# Patient Record
Sex: FEMALE | Race: WHITE | Employment: OTHER | URBAN - METROPOLITAN AREA
[De-identification: names, ages, dates, MRNs, and addresses within clinical notes are randomized per-mention and may not be internally consistent; named-entity substitution may affect disease eponyms.]

---

## 2017-02-14 ENCOUNTER — OFFICE VISIT (OUTPATIENT)
Dept: OBGYN CLINIC | Age: 52
End: 2017-02-14

## 2017-02-14 DIAGNOSIS — Z12.31 ENCOUNTER FOR SCREENING MAMMOGRAM FOR MALIGNANT NEOPLASM OF BREAST: Primary | ICD-10-CM

## 2017-02-17 ENCOUNTER — TELEPHONE (OUTPATIENT)
Dept: OBGYN CLINIC | Age: 52
End: 2017-02-17

## 2017-02-17 NOTE — TELEPHONE ENCOUNTER
Notified pt with MAMM tech recommendation that the prior MAMM result came today and will be uploaded and that she should receive the results Monday. Pt verbalized understanding.

## 2017-02-20 ENCOUNTER — TELEPHONE (OUTPATIENT)
Dept: OBGYN CLINIC | Age: 52
End: 2017-02-20

## 2017-02-20 NOTE — TELEPHONE ENCOUNTER
45year old patient last seen in the office on  2/14/17. Patient calling to check on her mammogram results. This nurse advised of MD reviewed mammogram results and advised patient that mammograms from  2011 and 2012 have been reviewed. Patient verbalized understanding.

## 2017-05-10 ENCOUNTER — HOSPITAL ENCOUNTER (OUTPATIENT)
Dept: INFUSION THERAPY | Age: 52
Discharge: HOME | End: 2017-05-10
Payer: COMMERCIAL

## 2017-05-10 ENCOUNTER — OFFICE VISIT (OUTPATIENT)
Dept: ONCOLOGY | Age: 52
End: 2017-05-10

## 2017-05-10 VITALS
RESPIRATION RATE: 16 BRPM | DIASTOLIC BLOOD PRESSURE: 81 MMHG | SYSTOLIC BLOOD PRESSURE: 119 MMHG | HEART RATE: 80 BPM | TEMPERATURE: 98.1 F

## 2017-05-10 VITALS
RESPIRATION RATE: 18 BRPM | BODY MASS INDEX: 20.25 KG/M2 | SYSTOLIC BLOOD PRESSURE: 135 MMHG | HEART RATE: 76 BPM | WEIGHT: 133.6 LBS | OXYGEN SATURATION: 98 % | TEMPERATURE: 97 F | HEIGHT: 68 IN | DIASTOLIC BLOOD PRESSURE: 103 MMHG

## 2017-05-10 DIAGNOSIS — D64.9 ANEMIA, UNSPECIFIED TYPE: Primary | ICD-10-CM

## 2017-05-10 DIAGNOSIS — Z15.89 MTHFR MUTATION: ICD-10-CM

## 2017-05-10 DIAGNOSIS — D72.819 LEUKOPENIA, UNSPECIFIED TYPE: ICD-10-CM

## 2017-05-10 LAB
BASOPHILS # BLD AUTO: 0 K/UL (ref 0–0.1)
BASOPHILS # BLD: 1 % (ref 0–1)
EOSINOPHIL # BLD: 0 K/UL (ref 0–0.4)
EOSINOPHIL NFR BLD: 1 % (ref 0–7)
ERYTHROCYTE [DISTWIDTH] IN BLOOD BY AUTOMATED COUNT: 13 % (ref 11.5–14.5)
FOLATE SERPL-MCNC: 16.2 NG/ML (ref 5–21)
HAPTOGLOB SERPL-MCNC: 158 MG/DL (ref 30–200)
HCT VFR BLD AUTO: 36.6 % (ref 35–47)
HCYS SERPL-SCNC: 12 UMOL/L (ref 3.7–13.9)
HGB BLD-MCNC: 12.2 G/DL (ref 11.5–16)
LDH SERPL L TO P-CCNC: 160 U/L (ref 81–246)
LYMPHOCYTES # BLD AUTO: 34 % (ref 12–49)
LYMPHOCYTES # BLD: 1.5 K/UL (ref 0.8–3.5)
MCH RBC QN AUTO: 31.2 PG (ref 26–34)
MCHC RBC AUTO-ENTMCNC: 33.3 G/DL (ref 30–36.5)
MCV RBC AUTO: 93.6 FL (ref 80–99)
MONOCYTES # BLD: 0.4 K/UL (ref 0–1)
MONOCYTES NFR BLD AUTO: 9 % (ref 5–13)
NEUTS SEG # BLD: 2.5 K/UL (ref 1.8–8)
NEUTS SEG NFR BLD AUTO: 55 % (ref 32–75)
PLATELET # BLD AUTO: 336 K/UL (ref 150–400)
RBC # BLD AUTO: 3.91 M/UL (ref 3.8–5.2)
RETICS/RBC NFR AUTO: 0.7 % (ref 0.7–2.1)
VIT B12 SERPL-MCNC: 414 PG/ML (ref 211–911)
WBC # BLD AUTO: 4.4 K/UL (ref 3.6–11)

## 2017-05-10 PROCEDURE — 83010 ASSAY OF HAPTOGLOBIN QUANT: CPT | Performed by: INTERNAL MEDICINE

## 2017-05-10 PROCEDURE — 36415 COLL VENOUS BLD VENIPUNCTURE: CPT | Performed by: INTERNAL MEDICINE

## 2017-05-10 PROCEDURE — 82607 VITAMIN B-12: CPT | Performed by: INTERNAL MEDICINE

## 2017-05-10 PROCEDURE — 83921 ORGANIC ACID SINGLE QUANT: CPT | Performed by: INTERNAL MEDICINE

## 2017-05-10 PROCEDURE — 82746 ASSAY OF FOLIC ACID SERUM: CPT | Performed by: INTERNAL MEDICINE

## 2017-05-10 PROCEDURE — 85045 AUTOMATED RETICULOCYTE COUNT: CPT | Performed by: INTERNAL MEDICINE

## 2017-05-10 PROCEDURE — 83090 ASSAY OF HOMOCYSTEINE: CPT | Performed by: INTERNAL MEDICINE

## 2017-05-10 PROCEDURE — 83615 LACTATE (LD) (LDH) ENZYME: CPT | Performed by: INTERNAL MEDICINE

## 2017-05-10 PROCEDURE — 85025 COMPLETE CBC W/AUTO DIFF WBC: CPT | Performed by: INTERNAL MEDICINE

## 2017-05-10 NOTE — PROGRESS NOTES
Blood pressure 119/81, pulse 80, temperature 98.1 °F (36.7 °C), resp. rate 16. Pt arrived at 1445,labs drawn peripherally from left [facility], [place]. Pt tolerated well and left at 1454.

## 2017-05-10 NOTE — PROGRESS NOTES
[address], [address] [place] [address]  [place], [place]   W: [phone]  F: [phone]     NEW HEME/ONC CONSULT      Reason for visit:  evaluation for treatment for leukopenia and anemia    HPI:   Vidal Cortez is a 45 y.o.  female who I was asked to see in consultation at the request of Dr. Harper Mills for evaluation for chronic anemia and leukopenia. Has a history of MTHFR mutation (homozygous) and issues with Y20 and folic acid. 11/8/16 ferritin 35; iron 89; % sat 38; TIBC 299  10/28/16 wbc 2.8; hgb 10.8; plt 220  11/8/16 wbc 3.8, hgb 11.7; plt 277    She feels extremely fatigued, occurring for 1.5 years, losing about 16 lbs in that time. No fevers, no appetite, no night sweats. Stated her skin color would change and had other skin changes, but those have resolved. Has had an DELONTE level to 1:2560. Has not ever had B12 injections, but has taken B12 PO vitamins. Does have occasional burning in her feet. Of note, her mother passed away yesterday. DX   Encounter Diagnoses   Name Primary?  Anemia, unspecified type Yes    Leukopenia, unspecified type     [facility]               Past Medical History:   Diagnosis Date    EBV positive mononucleosis syndrome 7/22/2009   Sarah Higgins infection     Hypoglycemia     Hypotension     Neurological disorder     annurysm     Parasite infection     Shingles 2006    Vasovagal syncope 10/2/2016    Zoster 7/22/2009     History reviewed. No pertinent surgical history.   Social History     Social History    Marital status:      Spouse name: N/A    Number of children: N/A    Years of education: N/A     Social History Main Topics    Smoking status: Never Smoker    Smokeless tobacco: None    Alcohol use Yes    Drug use: No    Sexual activity: Yes     Partners: Male     Other Topics Concern    None     Social History Narrative     Family History   Problem Relation Age of Onset    Anemia Mother      pernicious anemia    Heart Disease Mother     Hypertension Mother     Cancer Brother 52     thyroid    Diabetes Brother        Current Outpatient Prescriptions   Medication Sig Dispense Refill    cholecalciferol (VITAMIN D3) 1,000 unit cap Take 2,500 Units by mouth every seven (7) days.  LACTOBACILLUS COMBO NO.6 (PROBIOTIC COMPLEX PO) Take 1 Tab by mouth daily. Allergies   Allergen Reactions    Amoxicillin Nausea and Vomiting    Benadr [Diphenhydramine Hcl] Rash    Sulfa (Sulfonamide Antibiotics) Hives       Review of Systems    A comprehensive review of systems was performed and all systems were negative except for HPI. Objective:  Physical Exam:  Visit Vitals    BP (!) 135/103    Pulse 76    Temp 97 °F (36.1 °C) (Temporal)    Resp 18    Ht 5' 8\" (1.727 m)    Wt 133 lb 9.6 oz (60.6 kg)    SpO2 98%    BMI 20.31 kg/m2       General:  Alert, cooperative, no distress, appears stated age. Head:  Normocephalic, without obvious abnormality, atraumatic. Eyes:  Conjunctivae/corneas clear. PERRL, EOMs intact. Throat: Lips, mucosa, and tongue normal.    Neck: Supple, symmetrical, trachea midline, no adenopathy, thyroid: no enlargement/tenderness/nodules   Back:   Symmetric, no curvature. ROM normal. No CVA tenderness. Lungs:   Clear to auscultation bilaterally. Chest wall:  No tenderness or deformity. Heart:  Regular rate and rhythm, S1, S2 normal, no murmur, click, rub or gallop. Abdomen:   Soft, non-tender. Bowel sounds normal. No masses,  No organomegaly. Extremities: Extremities normal, atraumatic, no cyanosis or edema. Skin: Skin color, texture, turgor normal. No rashes or lesions. Neurologic: CNII-XII intact. Diagnostic Imaging   Results for orders placed during the hospital encounter of 09/04/16   XR CHEST PA LAT    Narrative Chest PA and lateral    History: Short of breath. Chest pain. Comparison: None    Findings:   The lungs are well expanded. No focal consolidation, pleural  effusion, or pneumothorax. The cardiomediastinal silhouette is unremarkable. The visualized osseous structures are unremarkable. Impression Impression:  No acute cardiopulmonary process. Lab Results  Lab Results   Component Value Date/Time    WBC 5.4 09/04/2016 10:42 PM    HGB 11.7 09/04/2016 10:42 PM    HCT 35.4 09/04/2016 10:42 PM    PLATELET 536 02/34/6972 10:42 PM    MCV 96.2 09/04/2016 10:42 PM       Lab Results   Component Value Date/Time    Sodium 137 09/04/2016 10:42 PM    Potassium 3.7 09/04/2016 10:42 PM    Chloride 104 09/04/2016 10:42 PM    CO2 26 09/04/2016 10:42 PM    Anion gap 7 09/04/2016 10:42 PM    Glucose 114 09/04/2016 10:42 PM    BUN 9 09/04/2016 10:42 PM    Creatinine 0.97 09/04/2016 10:42 PM    BUN/Creatinine ratio 9 09/04/2016 10:42 PM    GFR est AA >60 09/04/2016 10:42 PM    GFR est non-AA >60 09/04/2016 10:42 PM    Calcium 8.8 09/04/2016 10:42 PM    AST (SGOT) 20 09/04/2016 10:42 PM    Alk. phosphatase 60 09/04/2016 10:42 PM    Protein, total 7.3 09/04/2016 10:42 PM    Albumin 3.6 09/04/2016 10:42 PM    Globulin 3.7 09/04/2016 10:42 PM    A-G Ratio 1.0 09/04/2016 10:42 PM    ALT (SGPT) 23 09/04/2016 10:42 PM           Assessment/Plan:  45 y.o. female with leukopenia and anemia. Records reviewed and summarized above. PS 0    1. Anemia:  May be due to B12 or folate deficiency; will recheck those today to obtain current values and will check retic count as well; may need B12 repletion -- I would be happy to do or have Dr. Dana Arreaga perform. If B12 is deficient, would recommend 1 mg B12 IM for 5 days, then weekly x 4 weeks, then monthly with folic acid 1 mg daily PO    Will also check LD and haptoglobin, MMA, homocysteine    Will also check bone marrow biopsy to check for any myelofibrosis, discussed with patient and ordered. 2. Leukopenia:  May be due to cause of #1, will check cbc with diff and smear    3.  MTHFR mutation: likely contributing to #1; has never had a VTE    Thank you for this consult. All of the patient's questions were answered today. Will call her with lab results and see her when her biopsy results return. There are no Patient Instructions on file for this visit. Follow-up Disposition:  Return in about 2 weeks (around 5/24/2017).     Jessica Rice MD

## 2017-05-10 NOTE — MR AVS SNAPSHOT
Visit Information Date & Time Provider Department Dept. Phone Encounter #  
 5/10/2017  1:00 PM [facility] [address] [place] [address] [id number] Follow-up Instructions Return in about 2 weeks (around 5/24/2017). Follow-up and Disposition History Upcoming Health Maintenance Date Due Hepatitis C Screening 1965 DTaP/Tdap/Td series (1 - Tdap) 9/13/1985 FOBT Q 1 YEAR AGE 50-75 9/13/2014 INFLUENZA AGE 9 TO ADULT 8/1/2017 BREAST CANCER SCRN MAMMOGRAM 2/14/2019 PAP AKA CERVICAL CYTOLOGY 5/24/2019 Allergies as of 5/10/2017  Review Complete On: 5/10/2017 By: Jessica Rice MD  
  
 Severity Noted Reaction Type Reactions Amoxicillin  07/22/2009    Nausea and Vomiting Benadr [Diphenhydramine Hcl]  08/07/2015    Rash  
 Sulfa (Sulfonamide Antibiotics)  07/22/2009    Hives Current Immunizations  Never Reviewed No immunizations on file. Not reviewed this visit You Were Diagnosed With   
  
 Codes Comments Anemia, unspecified type    -  Primary ICD-10-CM: D64.9 ICD-9-CM: 285.9 Leukopenia, unspecified type     ICD-10-CM: D72.819 ICD-9-CM: 288.50 MTHFR mutation ([facility] 75.)     ICD-10-CM: E72.12 
ICD-9-CM: 270.4 Vitals BP Pulse Temp Resp Height(growth percentile) Weight(growth percentile) (!) 135/103 76 97 °F (36.1 °C) (Temporal) 18 5' 8\" (1.727 m) 133 lb 9.6 oz (60.6 kg) SpO2 BMI OB Status Smoking Status 98% 20.31 kg/m2 Menopause Never Smoker Vitals History BMI and BSA Data Body Mass Index Body Surface Area  
 20.31 kg/m 2 1.71 m 2 Preferred Pharmacy Pharmacy Name Phone [id number] [address], [address] [place] [address]  [phone] Your Updated Medication List  
  
   
This list is accurate as of: 5/10/17  2:24 PM.  Always use your most recent med list.  
  
  
  
  
 PROBIOTIC COMPLEX PO Take 1 Tab by mouth daily. VITAMIN D3 1,000 unit Cap Generic drug:  cholecalciferol Take 2,500 Units by mouth every seven (7) days. Follow-up Instructions Return in about 2 weeks (around 5/24/2017). To-Do List   
 05/15/2017 Imaging:  CT BX BONE MARROW MERVIN/SUSANA Introducing [facility] SERVICES! Dear Brent Maddox: 
Thank you for requesting a Elevaate account. Our records indicate that you already have an active Elevaate account. You can access your account anytime at [email or web address] [email or web address] [email or web address]. [employer]/[address] [facility] [employer] Did you know that you can access your hospital and ER discharge instructions at any time in Elevaate? You can also review all of your test results from your hospital stay or ER visit. Additional Information If you have questions, please visit the Frequently Asked Questions section of the Elevaate website at [email or web address] Remember, Elevaate is NOT to be used for urgent needs. For medical emergencies, dial 911. Now available from your iPhone and Android! Please provide this summary of care documentation to your next provider. Your primary care clinician is listed as Dmitriy Kruger. If you have any questions after today's visit, please call [phone].

## 2017-05-15 ENCOUNTER — TELEPHONE (OUTPATIENT)
Dept: ONCOLOGY | Age: 52
End: 2017-05-15

## 2017-05-15 NOTE — TELEPHONE ENCOUNTER
Called the patient and verified ID x 2. The patient stated that she spoke with Evelyn Sal NP about her lab results and that they looked good but stated that her Homocysteine level was 12 and previously it was a 9 last December 2016 and a 7 in March 2017 and inquired if she should be concerned since her levels increased by 5 in two months. The patient also stated that she is nervous about the bone marrow biopsy tomorrow and that the slides he looked at were from May or June of 2016 and inquired if he would like to see her blood under a microscope again before the biopsy since those slides were a year ago. Informed the patient that her questions and concerns will be brought to Dr. Mono Thomason and Evelyn Sal NP and that this office will call back with recommendations. The patient verbalized understanding and denied any further questions or concerns.

## 2017-05-15 NOTE — TELEPHONE ENCOUNTER
Called the patient and verified ID x 2. Informed the patient that Dr. Chema Rodrigues is aware of the increase in her Homocysteine levels and that although there was an increase, it is still within normal limits and that he will continue to monitor her levels. Also, informed the patient that Dr. Chema Rodrigues does not recommend her blood be looked at under a microscope but can have labs re-drawn and that the biopsy can be re-scheduled for a later date. The patient stated that she was tested at [facility] for \"staph\" and was informed that she should be checked to see if she has \"staph\" on her skin. Encouraged the patient to have that checked and the patient stated that she may go to [facility] to be checked. Informed the patient that if she does and the test comes back positive to call the scheduling team to re-schedule the bone marrow biopsy as Dr. Chema Rodrigues still recommends this procedure but that it is not urgent. The patient verbalized understanding and denied any further questions or concerns.

## 2017-05-15 NOTE — TELEPHONE ENCOUNTER
Pt called requesting lab results.  She stated someone called her Friday to discuss but she was at her mother's . Call back number [phone]

## 2017-05-16 LAB — METHYLMALONATE SERPL-SCNC: 63 NMOL/L (ref 0–378)

## 2017-06-13 ENCOUNTER — HOSPITAL ENCOUNTER (OUTPATIENT)
Dept: CT IMAGING | Age: 52
Discharge: HOME | End: 2017-06-13
Attending: INTERNAL MEDICINE
Payer: COMMERCIAL

## 2017-06-13 VITALS
HEART RATE: 67 BPM | OXYGEN SATURATION: 100 % | RESPIRATION RATE: 14 BRPM | DIASTOLIC BLOOD PRESSURE: 53 MMHG | SYSTOLIC BLOOD PRESSURE: 102 MMHG

## 2017-06-13 DIAGNOSIS — D64.9 ANEMIA, UNSPECIFIED TYPE: ICD-10-CM

## 2017-06-13 DIAGNOSIS — Z15.89 MTHFR MUTATION: ICD-10-CM

## 2017-06-13 DIAGNOSIS — D72.819 LEUKOPENIA, UNSPECIFIED TYPE: ICD-10-CM

## 2017-06-13 LAB
BASOPHILS # BLD AUTO: 0 K/UL (ref 0–0.1)
BASOPHILS # BLD: 1 % (ref 0–1)
EOSINOPHIL # BLD: 0.3 K/UL (ref 0–0.4)
EOSINOPHIL NFR BLD: 6 % (ref 0–7)
ERYTHROCYTE [DISTWIDTH] IN BLOOD BY AUTOMATED COUNT: 13.1 % (ref 11.5–14.5)
HCT VFR BLD AUTO: 35.8 % (ref 35–47)
HGB BLD-MCNC: 11.9 G/DL (ref 11.5–16)
LYMPHOCYTES # BLD AUTO: 33 % (ref 12–49)
LYMPHOCYTES # BLD: 1.4 K/UL (ref 0.8–3.5)
MCH RBC QN AUTO: 31.6 PG (ref 26–34)
MCHC RBC AUTO-ENTMCNC: 33.2 G/DL (ref 30–36.5)
MCV RBC AUTO: 95 FL (ref 80–99)
MONOCYTES # BLD: 0.4 K/UL (ref 0–1)
MONOCYTES NFR BLD AUTO: 11 % (ref 5–13)
NEUTS SEG # BLD: 2.1 K/UL (ref 1.8–8)
NEUTS SEG NFR BLD AUTO: 49 % (ref 32–75)
PLATELET # BLD AUTO: 293 K/UL (ref 150–400)
RBC # BLD AUTO: 3.77 M/UL (ref 3.8–5.2)
WBC # BLD AUTO: 4.2 K/UL (ref 3.6–11)

## 2017-06-13 PROCEDURE — 85025 COMPLETE CBC W/AUTO DIFF WBC: CPT | Performed by: RADIOLOGY

## 2017-06-13 PROCEDURE — 38221 DX BONE MARROW BIOPSIES: CPT

## 2017-06-13 PROCEDURE — 99152 MOD SED SAME PHYS/QHP 5/>YRS: CPT

## 2017-06-13 PROCEDURE — 74011250636 HC RX REV CODE- 250/636: Performed by: RADIOLOGY

## 2017-06-13 PROCEDURE — 36415 COLL VENOUS BLD VENIPUNCTURE: CPT | Performed by: RADIOLOGY

## 2017-06-13 PROCEDURE — 74011000250 HC RX REV CODE- 250: Performed by: RADIOLOGY

## 2017-06-13 PROCEDURE — 77030028872 HC BN BIOP NDL ON CNTRL TY TELE -C

## 2017-06-13 RX ORDER — FENTANYL CITRATE 50 UG/ML
100 INJECTION, SOLUTION INTRAMUSCULAR; INTRAVENOUS
Status: DISCONTINUED | OUTPATIENT
Start: 2017-06-13 | End: 2017-06-13

## 2017-06-13 RX ORDER — MIDAZOLAM HYDROCHLORIDE 1 MG/ML
5 INJECTION, SOLUTION INTRAMUSCULAR; INTRAVENOUS
Status: DISCONTINUED | OUTPATIENT
Start: 2017-06-13 | End: 2017-06-13

## 2017-06-13 RX ADMIN — FENTANYL CITRATE 25 MCG: 50 INJECTION, SOLUTION INTRAMUSCULAR; INTRAVENOUS at 09:45

## 2017-06-13 RX ADMIN — FENTANYL CITRATE 25 MCG: 50 INJECTION, SOLUTION INTRAMUSCULAR; INTRAVENOUS at 09:54

## 2017-06-13 RX ADMIN — SODIUM BICARBONATE 2 ML: 0.2 INJECTION, SOLUTION INTRAVENOUS at 09:57

## 2017-06-13 RX ADMIN — MIDAZOLAM HYDROCHLORIDE 1 MG: 1 INJECTION, SOLUTION INTRAMUSCULAR; INTRAVENOUS at 09:57

## 2017-06-13 RX ADMIN — FENTANYL CITRATE 25 MCG: 50 INJECTION, SOLUTION INTRAMUSCULAR; INTRAVENOUS at 09:49

## 2017-06-13 RX ADMIN — MIDAZOLAM HYDROCHLORIDE 1 MG: 1 INJECTION, SOLUTION INTRAMUSCULAR; INTRAVENOUS at 09:45

## 2017-06-13 RX ADMIN — MIDAZOLAM HYDROCHLORIDE 1 MG: 1 INJECTION, SOLUTION INTRAMUSCULAR; INTRAVENOUS at 09:54

## 2017-06-13 RX ADMIN — MIDAZOLAM HYDROCHLORIDE 1 MG: 1 INJECTION, SOLUTION INTRAMUSCULAR; INTRAVENOUS at 09:49

## 2017-06-13 NOTE — PROCEDURES
PROCEDURE:Bone marrow biopsy. INDICATION:Anemia. ANESTHESIA:  COMPLICATION:NONE. SPECIMENS REMOVED:none. BLOOD LOSS:NONE. /ASSISTANT:BRIANNA Aguirre RECOMMENDATIONS:none. CONSENT OBTAINED:YES.  NOTES:none.

## 2017-06-13 NOTE — IP AVS SNAPSHOT
Current Discharge Medication List  
  
CONTINUE these medications which have NOT CHANGED Dose & Instructions Dispensing Information Comments Morning Noon Evening Bedtime PROBIOTIC COMPLEX PO Your last dose was: Your next dose is:    
   
   
 Dose:  1 Tab Take 1 Tab by mouth daily. Refills:  0  
     
   
   
   
  
 VITAMIN D3 1,000 unit Cap Generic drug:  cholecalciferol Your last dose was: Your next dose is:    
   
   
 Dose:  2500 Units Take 2,500 Units by mouth every seven (7) days. Refills:  0

## 2017-06-13 NOTE — IP AVS SNAPSHOT
Yuli Lana 
 
 
 [address] [place] [address] 
[phone] Patient: Jewell Reddy MRN: [id number] GNR:6/24/5432 You are allergic to the following Allergen Reactions Amoxicillin Nausea and Vomiting Benadr (Diphenhydramine Hcl) Rash  
    
 Sulfa (Sulfonamide Antibiotics) Hives Recent Documentation Breastfeeding? OB Status Smoking Status No Menopause Never Smoker Emergency Contacts Name Discharge Info Relation Home Work Mobile Lj Teixeira DISCHARGE CAREGIVER [3] Spouse [3] [phone] About your hospitalization You were admitted on:  June 13, 2017 You last received care in the:  [facility] RAD CT You were discharged on:  June 13, 2017 Unit phone number:  [phone] Why you were hospitalized Your primary diagnosis was:  Not on File Providers Seen During Your Hospitalizations Provider Role Specialty Primary office phone Vikash Gibbons MD Attending Provider Hematology and Oncology [phone] Your Primary Care Physician (PCP) Primary Care Physician Office Phone Office Fax Anupam Cornejo [phone] Follow-up Information Follow up With Details Comments Contact Info Storm Kaur MD   [address]  [address] [facility] 
[phone] Your Appointments Tuesday June 20, 2017  9:30 AM EDT  
ESTABLISHED PATIENT with Vikash Gibbons MD  
[facility] at [address] [place] [address]  
[phone] Current Discharge Medication List  
  
CONTINUE these medications which have NOT CHANGED Dose & Instructions Dispensing Information Comments Morning Noon Evening Bedtime PROBIOTIC COMPLEX PO Your last dose was: Your next dose is:    
   
   
 Dose:  1 Tab Take 1 Tab by mouth daily. Refills:  0  
     
   
   
   
  
 VITAMIN D3 1,000 unit Cap Generic drug:  cholecalciferol Your last dose was: Your next dose is:    
   
   
 Dose:  2500 Units Take 2,500 Units by mouth every seven (7) days. Refills:  0 Discharge Instructions Sedation for a Medical Procedure: After Your Visit  Instructions. Sedatives are used to relax you for a procedure. You may or may not be awake during the procedure. Common side effects of sedation medications include:    
 
            Drowsiness, dizziness, euphoria, sleepiness or confusion. I 
            Unsteady gait. Loss of fine muscle control and delayed reaction time. Visual                       disturbances, difficulty focusing and blurred vision. Impaired memory recall. Follow-up care is a key component for your health and safety. Be sure to make and go to all medical appointments. Call your doctor if you are having problems. It's also a good idea to keep a list of your allergies, medicines with doses and test results on hand Home care following your sedation procedure: You may experience some of these side effects or you may not be aware of subtle changes in your behavior or reaction time. Because you received these medications, we are giving you the following instructions: Activity For your safety, you should not drive until the medicine wears off and you can think clearly and react easily. Do not drive for 24 hours. Rest when you feel tired. Getting enough sleep will help you recover. Diet You can eat your normal diet. If your stomach is upset, try bland, low-fat foods like plain rice, broiled chicken, toast, and yogurt. Drink plenty of fluids unless your doctor advised you to limit your fluids. Do not consume alcoholic beverages for 24 hours. Instructions Do not make important personal, business, or legal decisions for 24 hours. Move slowly and carefully, do not make sudden position changes. Be alert for dizziness or lightheadedness and move accordingly. Have a responsible person assist you. Do not drive for 24 hours. Do not operate equipment for 24 hours. [employer] mowers, power tools, kitchen appliances, etc.) Discharge medications: 
Resume prior to test medications as prescribed by your personal physician. If you have any questions or concerns call Radiology RN at [phone] After hours call Radiology on-call at [phone] Call [phone] any time you think you may need emergency care. For example:  
             Call if you passed out (lost consciousness). The medicine is not wearing off and you cannot think clearly. Watch closely for changes in your health, and be sure to contact your doctor if                  you have any problems. Where can you learn more? Go to [employer].be Enter J587 in the search box to learn more about \"Sedation for a Medical Procedure: After Your Visit. \"   
 
  
Bone Marrow Aspiration and Biopsy: What to Expect at Home Your Recovery The biopsy site may feel sore for several days. It can help to walk, take pain medicine, and put ice packs on the site. You will probably be able to return to work and your usual activities the day after the procedure. Your doctor or nurse will call you with the results of your test. 
This care sheet gives you a general idea about how long it will take for you to recover. But each person recovers at a different pace. Follow the steps below to get better as quickly as possible. How can you care for yourself at home? Activity · Rest when you feel tired. Getting enough sleep will help you recover.  
· You may drive when you are no longer taking pain pills and can quickly move your foot from the gas pedal to the brake. You must also be able to sit comfortably for a long period of time, even if you do not plan to go far. You might get caught in traffic. · Most people are able to return to work the day after the procedure. Medicines · Your doctor will tell you if and when you can restart your medicines. He or she will also give you instructions about taking any new medicines. · If you take blood thinners, such as warfarin (Coumadin), clopidogrel (Plavix), or aspirin, be sure to talk to your doctor. He or she will tell you if and when to start taking those medicines again. Make sure that you understand exactly what your doctor wants you to do. · Be safe with medicines. Take pain medicines exactly as directed. ¨ If the doctor gave you a prescription medicine for pain, take it as prescribed. ¨ If you are not taking a prescription pain medicine, take an over-the-counter medicine such as acetaminophen (Tylenol), ibuprofen (Advil, Motrin), or naproxen (Aleve). Read and follow all instructions on the label. ¨ Do not take two or more pain medicines at the same time unless the doctor told you to. Many pain medicines have acetaminophen, which is Tylenol. Too much acetaminophen (Tylenol) can be harmful. · If you think your pain medicine is making you sick to your stomach: 
¨ Take your medicine after meals (unless your doctor has told you not to). ¨ Ask your doctor for a different pain medicine. · If your doctor prescribed antibiotics, take them as directed. Do not stop taking them just because you feel better. Ice · Put ice or a cold pack on the biopsy site for 10 to 20 minutes at a time. Put a thin cloth between the ice and your skin. Follow-up care is a key part of your treatment and safety. Be sure to make and go to all appointments, and call your doctor if you are having problems. It's also a good idea to know your test results and keep a list of the medicines you take. When should you call for help? Call 911 anytime you think you may need emergency care. For example, call if: 
· You passed out (lost consciousness). Call your doctor now or seek immediate medical care if: 
· You have signs of infection, such as: 
¨ Increased pain, swelling, warmth, or redness. ¨ Red streaks leading from the biopsy site. ¨ Pus draining from the biopsy site. ¨ Swollen lymph nodes in your neck, armpits, or groin. ¨ A fever. Watch closely for any changes in your health, and be sure to contact your doctor if: 
· You are not getting better as expected. Where can you learn more? Go to http://yordan-nancy.info/. Enter E148 in the search box to learn more about \"Bone Marrow Aspiration and Biopsy: What to Expect at Home. \" Current as of: October 14, 2016 Content Version: 11.2 © 5935-3835 [place] [facility]. Care instructions adapted under license by [employer] (which disclaims liability or warranty for this information). If you have questions about a medical condition or this instruction, always ask your healthcare professional. [place] [address] any warranty or liability for your use of this information. Discharge Orders None Introducing [facility] SERVICES! Dear Micheline Hale: 
Thank you for requesting a [employer] Therapy account. Our records indicate that you already have an active Res[employer] account. You can access your account anytime at [email or web address] Did you know that you can access your hospital and ER discharge instructions at any time in [employer]? You can also review all of your test results from your hospital stay or ER visit. Additional Information If you have questions, please visit the Frequently Asked Questions section of the [employer] Therapy website at [email or web address] Remember, [employer] is NOT to be used for urgent needs. For medical emergencies, dial 911. Now available from your iPhone and Android! General Information Please provide this summary of care documentation to your next provider. Patient Signature:  ____________________________________________________________ Date:  ____________________________________________________________  
  
Leocadia Nim Provider Signature:  ____________________________________________________________ Date:  ____________________________________________________________

## 2017-06-13 NOTE — DISCHARGE INSTRUCTIONS
Sedation for a Medical Procedure: After Your Visit  Instructions. Sedatives are used to relax you for a procedure. You may or may not be awake during the procedure. Common side effects of sedation medications include:                   Drowsiness, dizziness, euphoria, sleepiness or confusion. I              Unsteady gait. Loss of fine muscle control and delayed reaction time. Visual                       disturbances, difficulty focusing and blurred vision. Impaired memory recall. Follow-up care is a key component for your health and safety. Be sure to make and go to all medical appointments. Call your doctor if you are having problems. It's also a good idea to keep a list of your allergies, medicines with doses and test results on hand    Home care following your sedation procedure: You may experience some of these side effects or you may not be aware of subtle changes in your behavior or reaction time. Because you received these medications, we are giving you the following instructions: Activity   For your safety, you should not drive until the medicine wears off and you can think clearly and react easily. Do not drive for 24 hours. Rest when you feel tired. Getting enough sleep will help you recover. Diet    You can eat your normal diet. If your stomach is upset, try bland, low-fat foods like plain rice, broiled chicken, toast, and yogurt. Drink plenty of fluids unless your doctor advised you to limit your fluids. Do not consume alcoholic beverages for 24 hours. Instructions  Do not make important personal, business, or legal decisions for 24 hours. Move slowly and carefully, do not make sudden position changes. Be alert for dizziness or lightheadedness and move accordingly. Have a responsible person assist you. Do not drive for 24 hours. Do not operate equipment for 24 hours. [employer], power tools, kitchen appliances, etc.)    Discharge medications:  Resume prior to test medications as prescribed by your personal physician. If you have any questions or concerns call Radiology RN at [phone]  After hours call Radiology on-call at [phone]    Call 331 any time you think you may need emergency care. For example:                Call if you passed out (lost consciousness). The medicine is not wearing off and you cannot think clearly. Watch closely for changes in your health, and be sure to contact your doctor if                  you have any problems. Where can you learn more? Go to [facility] [email or web address]   Enter G817 in the search box to learn more about \"Sedation for a Medical Procedure: After Your Visit. \"         Bone Marrow Aspiration and Biopsy: What to Expect at Home  Your Recovery  The biopsy site may feel sore for several days. It can help to walk, take pain medicine, and put ice packs on the site. You will probably be able to return to work and your usual activities the day after the procedure. Your doctor or nurse will call you with the results of your test.  This care sheet gives you a general idea about how long it will take for you to recover. But each person recovers at a different pace. Follow the steps below to get better as quickly as possible. How can you care for yourself at home? Activity  · Rest when you feel tired. Getting enough sleep will help you recover. · You may drive when you are no longer taking pain pills and can quickly move your foot from the gas pedal to the brake. You must also be able to sit comfortably for a long period of time, even if you do not plan to go far. You might get caught in traffic. · Most people are able to return to work the day after the procedure. Medicines  · Your doctor will tell you if and when you can restart your medicines.  He or she will also give you instructions about taking any new medicines. · If you take blood thinners, such as warfarin (Coumadin), clopidogrel (Plavix), or aspirin, be sure to talk to your doctor. He or she will tell you if and when to start taking those medicines again. Make sure that you understand exactly what your doctor wants you to do. · Be safe with medicines. Take pain medicines exactly as directed. ¨ If the doctor gave you a prescription medicine for pain, take it as prescribed. ¨ If you are not taking a prescription pain medicine, take an over-the-counter medicine such as acetaminophen (Tylenol), ibuprofen (Advil, Motrin), or naproxen (Aleve). Read and follow all instructions on the label. ¨ Do not take two or more pain medicines at the same time unless the doctor told you to. Many pain medicines have acetaminophen, which is Tylenol. Too much acetaminophen (Tylenol) can be harmful. · If you think your pain medicine is making you sick to your stomach:  ¨ Take your medicine after meals (unless your doctor has told you not to). ¨ Ask your doctor for a different pain medicine. · If your doctor prescribed antibiotics, take them as directed. Do not stop taking them just because you feel better. Ice  · Put ice or a cold pack on the biopsy site for 10 to 20 minutes at a time. Put a thin cloth between the ice and your skin. Follow-up care is a key part of your treatment and safety. Be sure to make and go to all appointments, and call your doctor if you are having problems. It's also a good idea to know your test results and keep a list of the medicines you take. When should you call for help? Call 911 anytime you think you may need emergency care. For example, call if:  · You passed out (lost consciousness). Call your doctor now or seek immediate medical care if:  · You have signs of infection, such as:  ¨ Increased pain, swelling, warmth, or redness. ¨ Red streaks leading from the biopsy site. ¨ Pus draining from the biopsy site.   ¨ Swollen lymph nodes in your neck, armpits, or groin. ¨ A fever. Watch closely for any changes in your health, and be sure to contact your doctor if:  · You are not getting better as expected. Where can you learn more? Go to http://yordan-nancy.info/. Enter E148 in the search box to learn more about \"Bone Marrow Aspiration and Biopsy: What to Expect at Home. \"  Current as of: October 14, 2016  Content Version: 11.2  © 6574-0650 [employer]. Care instructions adapted under license by [employer] (which disclaims liability or warranty for this information). If you have questions about a medical condition or this instruction, always ask your healthcare professional. [address] any warranty or liability for your use of this information.

## 2017-06-13 NOTE — PROGRESS NOTES
Pt received to [facility] ambulatory. Confirmed NPO, allergies reviewed. Changed to gown and assessed. LS clear, HS S1, S2.  Pt extremely anxious, tearful. Airway, Mallampati 1 with 3 fingers. Vitals taken, stable, pt's BP noted diastolic 39. Pt states she frequently has this, and suffers from orthostatic hypotension. IV started, blood drawn/sent to lab. Dr Jeff Chung in to consent patient, questions answered. Taken to CT, prepped on table. Timeout at [phone], start time at 0671. Stop time 1006, pt tolerated well. Total of 4 mg versed and 75 mcg fentanyl given. Clean dressing applied to pt's low back. Returned to [facility] in stable condition. Taking PO. Pt's spouse notified of pt's status, brought back to recovery area. Pt given discharge directions, questions answered. IV removed, pt dressed, vitals stable, no orthostatic symptoms. Pt wheeled out to [facility] for discharge.

## 2017-06-13 NOTE — H&P
Radiology History and Physical    Patient: Emy Mendez 45 y.o. female     Chief Complaint: No chief complaint on file. History of Present Illness: Anemia. Folate abnormality. History:    Past Medical History:   Diagnosis Date    Anemia     Arrhythmia     EBV positive mononucleosis syndrome 7/22/2009   Francia Lowry infection     Hypoglycemia     Hypotension     Neurological disorder     annurysm     Parasite infection     Shingles 2006    Vasovagal syncope 10/2/2016    Zoster 7/22/2009     Family History   Problem Relation Age of Onset    Anemia Mother      pernicious anemia    Heart Disease Mother     Hypertension Mother     Cancer Brother 52     thyroid    Diabetes Brother      Social History     Social History    Marital status:      Spouse name: N/A    Number of children: N/A    Years of education: N/A     Occupational History    Not on file. Social History Main Topics    Smoking status: Never Smoker    Smokeless tobacco: Not on file    Alcohol use Yes    Drug use: No    Sexual activity: Yes     Partners: Male     Other Topics Concern    Not on file     Social History Narrative       Allergies: Allergies   Allergen Reactions    Amoxicillin Nausea and Vomiting    Benadr [Diphenhydramine Hcl] Rash    Sulfa (Sulfonamide Antibiotics) Hives       Current Medications:  Current Outpatient Prescriptions   Medication Sig    cholecalciferol (VITAMIN D3) 1,000 unit cap Take 2,500 Units by mouth every seven (7) days.  LACTOBACILLUS COMBO NO.6 (PROBIOTIC COMPLEX PO) Take 1 Tab by mouth daily. No current facility-administered medications for this encounter. Physical Exam:  Blood pressure 102/53, pulse 67, resp. rate 14, SpO2 100 %, not currently breastfeeding.   GENERAL: alert, cooperative, no distress, appears stated age, LUNG: clear to auscultation bilaterally, HEART: regular rate and rhythm      Alerts:    Hospital Problems  Date Reviewed: 5/10/2017 None          Laboratory:      Recent Labs      06/13/17   0914   HGB  11.9   HCT  35.8   WBC  4.2   PLT  293         Plan of Care/Planned Procedure:  Risks, benefits, and alternatives reviewed with patient and she agrees to proceed with the procedure.

## 2017-06-14 ENCOUNTER — TELEPHONE (OUTPATIENT)
Dept: ONCOLOGY | Age: 52
End: 2017-06-14

## 2017-06-14 NOTE — TELEPHONE ENCOUNTER
Patient called and stated that she had a CT yesterday and wanted to know if the results were back in. Patient stated that she has an appointment with Dr. Eve Pink on Tuesday. Patient stated that they are supposed to be getting a new puppy on Sunday and they do not want to get involved with something like this if the results are not good and they are going to need more follow up appointments.  # [phone]

## 2017-06-15 NOTE — TELEPHONE ENCOUNTER
Received a call from the patient and verified ID x 2. The patient inquired if the results from her biopsy were back and informed her that bone marrow biopsy results can take seven to ten days for results to come back. The patient also stated that after she saw Dr. Judy Gibson she had a lab re-run her blood analysis about three weeks ago and her miguel a cells went from +4 to +1 and her dacrocytes went from +1 to 0 and that she is not as weak as she has been, she is not as short of breath as she has been, and she gained some weight but she is not back to baseline as she is not able to drive or go to work as she does not have the stamina to do so yet. The patient also stated her last blood analysis was completed last October 2016 and the lab was very surprised at her results. The patient also stated she has changed her diet drastically and now includes Folate and she was on Doxycycline for three straight weeks for a UTI and is unsure if that has anything to do with how she is feeling currently. Informed the patient that Dr. Judy Gibson will be updated on her status and lab results and that this office will check with pathology to see if they are able to provide an estimated time for her results and if they will not be back by Tuesday 6/20/17 this office will call her to reschedule her appointment on 6/20/17. The patient verbalized understanding and denied any further questions or concerns.

## 2017-06-16 NOTE — TELEPHONE ENCOUNTER
Patient called and stated that she received a reminder text from our office that she has an appointment on Tuesday but spoke with kyung yesterday and he informed her that the results might not be in for that appointment and our office would call her to let her know if she needed to keep the appointment or not.  #  [phone]   Colt Fitzgerald MD  [address] [place] [address]  [phone]    In 1 week  if no improvement         Medication List      New Prescriptions          diclofenac 75 MG EC tablet   Commonly known as:  VOLTAREN   Take 1 tablet by mouth 2 (Two) Times a Day As Needed (pain).         Stop          cephalexin 500 MG capsule   Commonly known as:  KEFLEX               Latest Documented Vital Signs:  As of 2:54 AM  BP- 122/82 HR- 81 Temp- 99.4 °F (37.4 °C) (Tympanic) O2 sat- 98%    --  Documentation assistance provided by lee Aguilera for Jeffery Rodriguez.  Information recorded by the lee  was done at my direction and has been verified and validated by me.     Colton Aguilera  06/12/17 0254       DARON Hameed  06/12/17 5470

## 2017-06-16 NOTE — TELEPHONE ENCOUNTER
Spoke to patient and advised her that we would like for her to keep her appointment as scheduled for Tuesday and that we should know more on Monday whether or not biopsy results would be back. Advised patient that I would call her back on Monday and let her know if we needed to change her appointment date. Patient voices understanding and denies any further questions at this time.

## 2017-06-19 NOTE — TELEPHONE ENCOUNTER
Spoke to patient and advised her that per pathology department bone marrow results wouldn't be back for a few more days. Advised patient that we needed to re-schedule her appointment from tomorrow until next week when the results are back. Appointment scheduled for 6/27/17 at 11:30 a.m. With Dr. Olena Fernandez. Patient voices understanding and denies any further questions at this time.

## 2017-06-21 NOTE — PROGRESS NOTES
Discussed results with the patient. She is going to return to her rheumatologist to be evaluated for the deanna. I will draw a iron profile and ferritin as patient is concerned with her low RBC. We will call her with those results and develop a plan from there.

## 2017-06-27 ENCOUNTER — HOSPITAL ENCOUNTER (OUTPATIENT)
Dept: INFUSION THERAPY | Age: 52
Discharge: HOME | End: 2017-06-27
Payer: COMMERCIAL

## 2017-06-27 ENCOUNTER — OFFICE VISIT (OUTPATIENT)
Dept: ONCOLOGY | Age: 52
End: 2017-06-27

## 2017-06-27 VITALS
HEIGHT: 68 IN | HEART RATE: 75 BPM | WEIGHT: 142 LBS | DIASTOLIC BLOOD PRESSURE: 70 MMHG | TEMPERATURE: 96.7 F | BODY MASS INDEX: 21.52 KG/M2 | SYSTOLIC BLOOD PRESSURE: 119 MMHG | OXYGEN SATURATION: 99 %

## 2017-06-27 DIAGNOSIS — Z15.89 MTHFR MUTATION: ICD-10-CM

## 2017-06-27 DIAGNOSIS — D64.9 ANEMIA, UNSPECIFIED TYPE: Primary | ICD-10-CM

## 2017-06-27 LAB
25(OH)D3 SERPL-MCNC: 25.1 NG/ML (ref 30–100)
FERRITIN SERPL-MCNC: 26 NG/ML (ref 8–252)
IRON SATN MFR SERPL: 26 % (ref 20–50)
IRON SERPL-MCNC: 90 UG/DL (ref 35–150)
TIBC SERPL-MCNC: 345 UG/DL (ref 250–450)

## 2017-06-27 PROCEDURE — 82306 VITAMIN D 25 HYDROXY: CPT | Performed by: INTERNAL MEDICINE

## 2017-06-27 PROCEDURE — 36415 COLL VENOUS BLD VENIPUNCTURE: CPT | Performed by: INTERNAL MEDICINE

## 2017-06-27 PROCEDURE — 86235 NUCLEAR ANTIGEN ANTIBODY: CPT | Performed by: INTERNAL MEDICINE

## 2017-06-27 PROCEDURE — 82728 ASSAY OF FERRITIN: CPT | Performed by: INTERNAL MEDICINE

## 2017-06-27 PROCEDURE — 86225 DNA ANTIBODY NATIVE: CPT | Performed by: INTERNAL MEDICINE

## 2017-06-27 PROCEDURE — 86038 ANTINUCLEAR ANTIBODIES: CPT | Performed by: INTERNAL MEDICINE

## 2017-06-27 PROCEDURE — 83540 ASSAY OF IRON: CPT | Performed by: INTERNAL MEDICINE

## 2017-06-27 NOTE — PROGRESS NOTES
Patient is here for results.     Visit Vitals    /70 (BP 1 Location: Left arm, BP Patient Position: Sitting)    Pulse 75    Temp 96.7 °F (35.9 °C) (Oral)    Ht 5' 8\" (1.727 m)    Wt 142 lb (64.4 kg)    SpO2 99%    BMI 21.59 kg/m2

## 2017-06-27 NOTE — MR AVS SNAPSHOT
Visit Information Date & Time Provider Department Dept. Phone Encounter #  
 6/27/2017 11:30 AM Carissa [facility]am [facility] [address] [place] [address] Rd [id number] Follow-up Instructions Return if symptoms worsen or fail to improve. Follow-up and Disposition History Upcoming Health Maintenance Date Due Hepatitis C Screening 1965 Pneumococcal 19-64 Highest Risk (1 of 3 - PCV13) 9/13/1983 DTaP/Tdap/Td series (1 - Tdap) 9/13/1985 FOBT Q 1 YEAR AGE 50-75 9/13/2014 INFLUENZA AGE 9 TO ADULT 8/1/2017 BREAST CANCER SCRN MAMMOGRAM 2/14/2019 PAP AKA CERVICAL CYTOLOGY 5/24/2019 Allergies as of 6/27/2017  Review Complete On: 6/27/2017 By: Carissa Gilliam MD  
  
 Severity Noted Reaction Type Reactions Amoxicillin  07/22/2009    Nausea and Vomiting Benadr [Diphenhydramine Hcl]  08/07/2015    Rash  
 Sulfa (Sulfonamide Antibiotics)  07/22/2009    Hives Current Immunizations  Never Reviewed No immunizations on file. Not reviewed this visit You Were Diagnosed With   
  
 Codes Comments Anemia, unspecified type    -  Primary ICD-10-CM: D64.9 ICD-9-CM: 285.9 MTHFR mutation ([facility] 75.)     ICD-10-CM: E72.12 
ICD-9-CM: 270.4 Vitals BP Pulse Temp Height(growth percentile) Weight(growth percentile) SpO2  
 119/70 (BP 1 Location: Left arm, BP Patient Position: Sitting) 75 96.7 °F (35.9 °C) (Oral) 5' 8\" (1.727 m) 142 lb (64.4 kg) 99% BMI OB Status Smoking Status 21.59 kg/m2 Menopause Never Smoker BMI and BSA Data Body Mass Index Body Surface Area  
 21.59 kg/m 2 1.76 m 2 Preferred Pharmacy Pharmacy Name Phone [id number] [phone], [id number] [place] [address]  [phone] Your Updated Medication List  
  
   
This list is accurate as of: 6/27/17 12:43 PM.  Always use your most recent med list.  
  
  
  
  
 PROBIOTIC COMPLEX PO Take 1 Tab by mouth daily. VITAMIN D3 1,000 unit Cap Generic drug:  cholecalciferol Take 2,500 Units by mouth every seven (7) days. We Performed the Following REFERRAL TO INFECTIOUS DISEASE [REF37 Custom] Comments:  
 Please evaluate patient for tick borne illnesses Follow-up Instructions Return if symptoms worsen or fail to improve. Referral Information Referral ID Referred By Referred To  
  
 [id number] Chela Modi, DO   
   [address] [facility] [address] [facility], [address] [facility] Phone: [phone] Fax: [phone] Visits Status Start Date End Date 1 New Request 6/27/17 6/27/18 If your referral has a status of pending review or denied, additional information will be sent to support the outcome of this decision. Introducing [facility]! Dear Andry Coley: 
Thank you for requesting a [employer] account. Our records indicate that you already have an active [employer] account. You can access your account anytime at [email or web address]. [email or web address] Did you know that you can access your hospital and ER discharge instructions at any time in [employer]? You can also review all of your test results from your hospital stay or ER visit. Additional Information If you have questions, please visit the Frequently Asked Questions section of the [employer] website at [email or web address]. [email or web address]/. Remember, [employer] is NOT to be used for urgent needs. For medical emergencies, dial 911. Now available from your iPhone and Android! Please provide this summary of care documentation to your next provider. Your primary care clinician is listed as Kateryna Ghotra. If you have any questions after today's visit, please call [phone].

## 2017-06-27 NOTE — PROGRESS NOTES
There were no vitals taken for this visit. Pt arrived at 1255,pt declined to have vitals taken. Lab drawn peripherally from right [facility]. Pt tolerated well and left at 1300.

## 2017-06-27 NOTE — PROGRESS NOTES
[employer]  [address] [place] [address], [address] [place] [address]  Heath Leslie 18  W: [phone]  F: [phone]     f/u HEME/ONC CONSULT      Reason for visit:  evaluation for treatment for leukopenia and anemia    HPI:   Vidal Diaz is a 45 y.o.  female who I was asked to see in consultation at the request of Dr. Ai Ugarte for evaluation for chronic anemia and leukopenia. Has a history of MTHFR mutation (homozygous) and issues with O69 and folic acid. 11/8/16 ferritin 35; iron 89; % sat 38; TIBC 299  10/28/16 wbc 2.8; hgb 10.8; plt 220  11/8/16 wbc 3.8, hgb 11.7; plt 277    She feels extremely fatigued, occurring for 1.5 years, losing about 16 lbs in that time. No fevers, no appetite, no night sweats. Stated her skin color would change and had other skin changes, but those have resolved. Has had an DELONTE level to 1:2560. She is feeling much better today. States that her amount of miguel a cells have decreased. She states that she has been found       DX   No diagnosis found. Past Medical History:   Diagnosis Date    Anemia     Arrhythmia     EBV positive mononucleosis syndrome 7/22/2009   [employer] infection     Hypoglycemia     Hypotension     Neurological disorder     annurysm     Parasite infection     Shingles 2006    Vasovagal syncope 10/2/2016    Zoster 7/22/2009     No past surgical history on file.   Social History     Social History    Marital status:      Spouse name: N/A    Number of children: N/A    Years of education: N/A     Social History Main Topics    Smoking status: Never Smoker    Smokeless tobacco: None    Alcohol use Yes    Drug use: No    Sexual activity: Yes     Partners: Male     Other Topics Concern    None     Social History Narrative     Family History   Problem Relation Age of Onset    Anemia Mother      pernicious anemia    Heart Disease Mother     Hypertension Mother     Cancer Brother 52     thyroid    Diabetes Brother        Current Outpatient Prescriptions   Medication Sig Dispense Refill    LACTOBACILLUS COMBO NO.6 (PROBIOTIC COMPLEX PO) Take 1 Tab by mouth daily.  cholecalciferol (VITAMIN D3) 1,000 unit cap Take 2,500 Units by mouth every seven (7) days. Allergies   Allergen Reactions    Amoxicillin Nausea and Vomiting    Benadr [Diphenhydramine Hcl] Rash    Sulfa (Sulfonamide Antibiotics) Hives       Review of Systems    A comprehensive review of systems was performed and all systems were negative except for HPI. Objective:  Physical Exam:  Visit Vitals    /70 (BP 1 Location: Left arm, BP Patient Position: Sitting)    Pulse 75    Temp 96.7 °F (35.9 °C) (Oral)    Ht 5' 8\" (1.727 m)    Wt 142 lb (64.4 kg)    SpO2 99%    BMI 21.59 kg/m2       General: No distress  Respiratory: Normal respiratory effort  CV: No peripheral edema  Skin: No rashes, ecchymoses, or petechiae  Psych: Alert, oriented, normal mood/affect                                                                  Diagnostic Imaging   Results for orders placed during the hospital encounter of 09/04/16   XR CHEST PA LAT    Narrative Chest PA and lateral    History: Short of breath. Chest pain. Comparison: None    Findings: The lungs are well expanded. No focal consolidation, pleural  effusion, or pneumothorax. The cardiomediastinal silhouette is unremarkable. The visualized osseous structures are unremarkable. Impression Impression:  No acute cardiopulmonary process.              Lab Results  Lab Results   Component Value Date/Time    WBC 4.2 06/13/2017 09:14 AM    HGB 11.9 06/13/2017 09:14 AM    HCT 35.8 06/13/2017 09:14 AM    PLATELET 555 89/44/8328 09:14 AM    MCV 95.0 06/13/2017 09:14 AM       Lab Results   Component Value Date/Time    Sodium 137 09/04/2016 10:42 PM    Potassium 3.7 09/04/2016 10:42 PM    Chloride 104 09/04/2016 10:42 PM    CO2 26 09/04/2016 10:42 PM    Anion gap 7 09/04/2016 10:42 PM Glucose 114 09/04/2016 10:42 PM    BUN 9 09/04/2016 10:42 PM    Creatinine 0.97 09/04/2016 10:42 PM    BUN/Creatinine ratio 9 09/04/2016 10:42 PM    GFR est AA >60 09/04/2016 10:42 PM    GFR est non-AA >60 09/04/2016 10:42 PM    Calcium 8.8 09/04/2016 10:42 PM    AST (SGOT) 20 09/04/2016 10:42 PM    Alk. phosphatase 60 09/04/2016 10:42 PM    Protein, total 7.3 09/04/2016 10:42 PM    Albumin 3.6 09/04/2016 10:42 PM    Globulin 3.7 09/04/2016 10:42 PM    A-G Ratio 1.0 09/04/2016 10:42 PM    ALT (SGPT) 23 09/04/2016 10:42 PM     Lab Results   Component Value Date/Time    Reticulocyte count 0.7 05/10/2017 02:48 PM    Iron % saturation 22 05/24/2011 10:40 AM    TIBC 301 05/24/2011 10:40 AM    Ferritin 50 05/24/2011 10:40 AM    Vitamin B12 414 05/10/2017 02:48 PM    Folate 16.2 05/10/2017 02:48 PM    Homocysteine, plasma 12.0 05/10/2017 02:48 PM    Methylmalonic acid 63 05/10/2017 02:48 PM    Haptoglobin 158 05/10/2017 02:48 PM     05/10/2017 02:48 PM    TSH 3.00 02/23/2016 04:56 AM    Antinuclear Antibodies Direct Positive 05/24/2011 10:40 AM    Lipase 98 09/04/2016 10:42 PM     No results found for: INR, APTT, DDIMSQ, DDIME, [id number], Steva Broach, [address] [facility], [facility], [address], [id number], [id number], [id number], [id number], [id number], [id number]    6/13/17 bone marrow bx: Bone marrow aspirate smear, touch imprint, core biopsy, and clot section:   -Variably cellular marrow with maturing trilineage hematopoiesis and no increase in blast.   -Granuloma.   -Small lymphoid aggregate. - Mild reticulin fibrosis (grade MF-1). Peripheral blood:   -No significant morphologic abnormalities detected. See comments. Comments: The clinical history and accompanying CBC Data showing slight normocytic anemia is noted. Concurrent flow cytometric analysis ([id number], collected 6/13/17) reports no diagnostic immunophenotypic abnormalities.  On morphologic evaluation, the core biopsy is variably cellular (<5-40%) with maturing trilineage hematopoiesis and partial aspiration artifact. No overt dysplasia or increase in blasts is observed. A small, mixed lymphoid aggregate is identified, slightly atypical by virtue of increased B cells. Concurrent flow cytometric analysis shows no evidence of a lymphoproliferative disorder, and the aggregate is likely reactive/benign as can be seen in older patients, and patients with underlying immune disorders or systemic infections. A non-caseating granuloma, comprised of epithelioid histiocytes with variably admixed and surrounding plasma cells and lymphocytes is also detected. Bone marrow granulomas may be seen in infections, malignancies (hematologic and non- hematologic pertinent clinical/laboratory data and any ancillary studies is recommended. Assessment/Plan:  45 y.o. female with leukopenia and anemia. PS 0    1. Anemia:  Overall resolved; she is eating baby marisol and eating very healthy. May be due to B12 or folate deficiency    Overall, her marrow is normal, shows sign of reactivity, no evidence of primary myelofibrosis. Will check ferritin and vit D and DELONTE and iron profile today    On further history, she states that she has positive titers for RMSF, lyme, and beef allergies. Will refer her to ID for further testing. Certainly, tick-borne illnesses could contribute to her symptoms. 2. Leukopenia:  resolved    3. MTHFR mutation: likely contributing to #1; has never had a VTE    Thank you for this consult. All of the patient's questions were answered today. Will call her with lab results and see her when her biopsy results return. > 25 minutes were spent with this patient with > 50% of that time spent in face to face counseling. There are no Patient Instructions on file for this visit.    Follow-up Disposition: Not on File    Pascale Gonzalez MD

## 2017-06-28 ENCOUNTER — TELEPHONE (OUTPATIENT)
Dept: ONCOLOGY | Age: 52
End: 2017-06-28

## 2017-06-28 NOTE — TELEPHONE ENCOUNTER
Pt called stating that her Ferritin levels were as low as they have ever been at 26. Also that her iron saturation is down to 26%.  Would like to discuss with Dr. Saleem Posey CB# [phone]

## 2017-06-29 LAB
ANA SER QL: POSITIVE
ANTICHROMATIN ABS, ACHRLT: 1.6 AI (ref 0–0.9)
DSDNA AB SER-ACNC: <1 IU/ML (ref 0–9)
ENA SM AB SER-ACNC: <0.2 AI (ref 0–0.9)
ENA SM+RNP AB SER-ACNC: <0.2 AI (ref 0–0.9)
SEE BELOW:, 164879: ABNORMAL

## 2017-06-29 NOTE — TELEPHONE ENCOUNTER
Pt left a VM this morning stating the same thing as yesterday.  She also added that she spoke with her family doctor about her levels and was curious if they could save the bone marrow and use an iron stain on it to see what exactly her body is doing with the iron # [phone]

## 2017-06-30 NOTE — TELEPHONE ENCOUNTER
Patient left a voicemail and stated that she spoke to Dr. Peter Gutiérrez yesterday but had more questions.  # [phone]

## 2017-07-05 ENCOUNTER — TELEPHONE (OUTPATIENT)
Dept: ONCOLOGY | Age: 52
End: 2017-07-05

## 2017-07-06 NOTE — TELEPHONE ENCOUNTER
Spoke to patient and advised her that per LEO Hughes NP, her DELONTE level was positive and Vitamin D level was 25.1. Patient states she is not currently taking any Vitamin D and has just been trying to get out in the sun more. Patient states that she knew her DELONTE was positive, but that previously her DELONTE level was reported as a [place] number,\" and patient inquiring what number it was this time. Advised patient of all of her lab results and patient states she will call [facility] and see if they \"can tell me the range for my DELONTE level. \"  Advised patient that per LEO Hughes NP, she should start taking Vitamin D3 2000 units daily. Patient voices understanding and denies any further questions at this time.

## 2017-07-18 ENCOUNTER — TELEPHONE (OUTPATIENT)
Dept: ONCOLOGY | Age: 52
End: 2017-07-18

## 2017-07-18 NOTE — TELEPHONE ENCOUNTER
Patient called inquiring about her iron stain on her bone marrow. She would like to know if it was ordered and if so when will the results be back?  # [phone]

## 2017-07-19 ENCOUNTER — TELEPHONE (OUTPATIENT)
Dept: ONCOLOGY | Age: 52
End: 2017-07-19

## 2017-07-19 NOTE — TELEPHONE ENCOUNTER
Called Dr. Mazin Godinez office and left a voicemail to schedule pt appointment. Shaun Campos from Dr. Mazin Godinez office faxed a form stating pt is scheduled for 8/1/17 at 2pm with Dr. Mazin oGdinez and pt is aware.

## 2017-07-19 NOTE — TELEPHONE ENCOUNTER
Received a call from the patient and verified ID x 2. Informed the patient that the iron stain on her bone marrow was ordered and that Dr. Rama Mendez will call her to discuss the results by the end of business on Friday 7/21/17. The patient verbalized understanding and denied any further questions or concerns.

## 2017-08-02 ENCOUNTER — TELEPHONE (OUTPATIENT)
Dept: NEUROSURGERY | Age: 52
End: 2017-08-02

## 2017-08-02 ENCOUNTER — TELEPHONE (OUTPATIENT)
Dept: ONCOLOGY | Age: 52
End: 2017-08-02

## 2017-08-02 DIAGNOSIS — I67.1 CEREBRAL ANEURYSM, NONRUPTURED: Primary | Chronic | ICD-10-CM

## 2017-08-02 NOTE — TELEPHONE ENCOUNTER
Patient called and stated that Dr. Carol Ann Burnett would like to know if there is any usable Bone marrow left in order to do a fungal test. Callback for patient is [phone]

## 2017-08-02 NOTE — TELEPHONE ENCOUNTER
PC from patient concerning recent medical issues. 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 The reason for no follow up as recommended. 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 I have recommended she reutrn for follow up as soon as she feel able. She stated probably 2 months. She will call then and determine possibly a TOF at that time. Pt was due to RTC last year and was unable due to illness.

## 2017-08-02 NOTE — TELEPHONE ENCOUNTER
Spoke to patient and advised her that per Dr. Nader Mtz, Dr. Claudy Downs could call and speak to the pathology department directly and then also order any additional tests that he would like. Patient voices understanding and states, Nitin Newberry is probably the best, because he mentioned a couple of other tests he might like to add as well. \"  Patient states she will let Dr. Claudy Downs know. Patient denies any further questions at this time.

## 2017-08-15 DIAGNOSIS — D50.9 IRON DEFICIENCY ANEMIA, UNSPECIFIED IRON DEFICIENCY ANEMIA TYPE: Primary | ICD-10-CM

## 2017-08-16 LAB
ERYTHROCYTE [DISTWIDTH] IN BLOOD BY AUTOMATED COUNT: 13.8 % (ref 12.3–15.4)
FERRITIN SERPL-MCNC: 31 NG/ML (ref 15–150)
HCT VFR BLD AUTO: 35.4 % (ref 34–46.6)
HGB BLD-MCNC: 11.5 G/DL (ref 11.1–15.9)
IRON SATN MFR SERPL: 26 % (ref 15–55)
IRON SERPL-MCNC: 80 UG/DL (ref 27–159)
MCH RBC QN AUTO: 31.3 PG (ref 26.6–33)
MCHC RBC AUTO-ENTMCNC: 32.5 G/DL (ref 31.5–35.7)
MCV RBC AUTO: 97 FL (ref 79–97)
PLATELET # BLD AUTO: 340 X10E3/UL (ref 150–379)
RBC # BLD AUTO: 3.67 X10E6/UL (ref 3.77–5.28)
TIBC SERPL-MCNC: 309 UG/DL (ref 250–450)
UIBC SERPL-MCNC: 229 UG/DL (ref 131–425)
WBC # BLD AUTO: 4.5 X10E3/UL (ref 3.4–10.8)

## 2017-10-26 ENCOUNTER — OFFICE VISIT (OUTPATIENT)
Dept: OBGYN CLINIC | Age: 52
End: 2017-10-26

## 2017-10-26 ENCOUNTER — TELEPHONE (OUTPATIENT)
Dept: OBGYN CLINIC | Age: 52
End: 2017-10-26

## 2017-10-26 VITALS
HEIGHT: 68 IN | DIASTOLIC BLOOD PRESSURE: 80 MMHG | BODY MASS INDEX: 21.52 KG/M2 | WEIGHT: 142 LBS | SYSTOLIC BLOOD PRESSURE: 120 MMHG

## 2017-10-26 DIAGNOSIS — R10.2 PELVIC PAIN IN FEMALE: Primary | ICD-10-CM

## 2017-10-26 NOTE — PROGRESS NOTES
Pelvic Pain evaluation    Vidal Frost is a 47 y.o. female who complains of pelvic pain. The pain is described as dull and aching, and is 4/10 in intensity. Pain is located in the RLQ with radiation to LLQ. Menopause at 51. RMSF 2 years ago. Still has some other tick disease that makes her intolerant to meat. Alpha gal?    The pain started several months ago. Her symptoms have been stable since. Aggravating factors: activity. Alleviating factors: none. Associated symptoms: nausea. The patient denies fever, headache and vaginal bleeding. Was on compounded ERT by Dr. Rickey Sampson, including testosterone, which caused her levels to be extremely elevated. Ultrasound today showed:  UTERUS IS ANTEVERTED, NORMAL IN SIZE AND ECHOGENICITY. ENDOMETRIUM MEASURES 1-2MM IN THICKNESS. NO EVIDENCE OF MASS OR ABNORMALITY SEEN  WITHIN THE ENDOMETRIAL CAVITY. RIGHT OVARY APPEARS WITHIN NORMAL LIMITS. LEFT OVARY APPEARS WITHIN NORMAL LIMITS. NO FREE FLUID SEEN IN THE CDS. Past Medical History:   Diagnosis Date    Anemia     Arrhythmia     EBV positive mononucleosis syndrome 7/22/2009   Anibal Ellen infection     Hypoglycemia     Hypotension     Neurological disorder     annurysm     Parasite infection     Shingles 2006    Vasovagal syncope 10/2/2016    Zoster 7/22/2009     History reviewed. No pertinent surgical history. Social History     Occupational History    Not on file.      Social History Main Topics    Smoking status: Never Smoker    Smokeless tobacco: Never Used    Alcohol use Yes    Drug use: No    Sexual activity: Yes     Partners: Male     Birth control/ protection: None     Family History   Problem Relation Age of Onset    Anemia Mother      pernicious anemia    Heart Disease Mother     Hypertension Mother     Cancer Brother 52     thyroid    Diabetes Brother        Allergies   Allergen Reactions    Amoxicillin Nausea and Vomiting    Benadr [Diphenhydramine Hcl] Rash    Sulfa (Sulfonamide Antibiotics) Hives     Prior to Admission medications    Medication Sig Start Date End Date Taking? Authorizing Provider   cholecalciferol (VITAMIN D3) 1,000 unit cap Take 2,500 Units by mouth every seven (7) days. Gaby Reaves, MD   LACTOBACILLUS COMBO NO.6 (PROBIOTIC COMPLEX PO) Take 1 Tab by mouth daily. Historical Provider        Review of Systems: History obtained from the patient  Constitutional: negative for weight loss, fever, night sweats  Breast: negative for breast lumps, nipple discharge, galactorrhea  GI: negative for change in bowel habits, abdominal pain, black or bloody stools  : negative for frequency, dysuria, hematuria, vaginal discharge  MSK: negative for back pain, joint pain, muscle pain  Skin: negative for itching, rash, hives  Psych: negative for anxiety, depression, change in mood      Objective:    Visit Vitals    /80    Ht 5' 8\" (1.727 m)    Wt 142 lb (64.4 kg)    BMI 21.59 kg/m2       Physical Exam:     Constitutional  · Appearance: well-nourished, well developed, alert, in no acute distress    Skin  · General Inspection: no rash, no lesions identified    Neurologic/Psychiatric  · Mental Status:  · Orientation: grossly oriented to person, place and time  · Mood and Affect: mood normal, affect appropriate    Assessment:  Pelvic pain with no sign of gynecologic abnormality. US same as last US--perfectly normal.  Pt's medical hx is full of possible GI or other system sources of pain. Advised that ERT should not negatively affect any of those problems. Advised that compounded drugs no better than standard ERT. Does not need testosterone. Plan:   Advised to follow through with GI appt she has scheduled soon. If any other gyn symptoms arise we can revisit a gyn cause of pelvic pain. RTO for AE with MH as needed. RTO prn if symptoms persist or worsen. Instructions given to pt. Handouts given to pt.       I spent 40 minutes with the patient, more than half of which was face to face counseling.

## 2017-10-26 NOTE — TELEPHONE ENCOUNTER
Called and spoke with spouse and asked him to have the patient give a call back about her appointment today. She has not been seen since 12/2016 and she is coming in today for a second opinion. Would like to get a little more information as to what she is being seen for so we can plan accordingly.

## 2017-10-26 NOTE — TELEPHONE ENCOUNTER
Patient called back, she is coming in for a follow up ultrasound to make sure there is no blood/fluid in the uterus.  She stated it was a follow up appt from her previous ultrasound

## 2018-01-03 ENCOUNTER — TELEPHONE (OUTPATIENT)
Dept: ONCOLOGY | Age: 53
End: 2018-01-03

## 2018-01-03 ENCOUNTER — HOSPITAL ENCOUNTER (OUTPATIENT)
Dept: ULTRASOUND IMAGING | Age: 53
Discharge: HOME | End: 2018-01-03
Attending: FAMILY MEDICINE
Payer: COMMERCIAL

## 2018-01-03 DIAGNOSIS — R10.9 ABDOMINAL PAIN: ICD-10-CM

## 2018-01-03 PROCEDURE — 76705 ECHO EXAM OF ABDOMEN: CPT

## 2018-01-03 NOTE — TELEPHONE ENCOUNTER
Pt called stating she recently had lab work done with her PCP and her levels are fluctuating again. She stated she will have her PCP send over the recent results but she wants Dr. Amanda Villarreal to be aware and she would like to discuss the plan.  Call back number [phone]

## 2018-01-09 NOTE — TELEPHONE ENCOUNTER
Received a call from the patient and verified ID x 2. The patient stated that what they are \"doing\" is not \"working well\" because her counts are \"back down again. \"  The patient stated that she is trying to walk and resume her \"normal\" activity but her counts went down and blood was taken after five months by her PCP and the results were that her counts were low again and she feels that her counts are low because she is not \"back to full speed\" and it is hard to take iron pills everyday. The patient also stated that she is trying to figure this out and her PCP recommended that she get a colonoscopy which she will call to schedule to see if there is any bleeding in her stomach or if there is any blood loss from somewhere and that they may have her swallow a pill camera or they will perform a colonoscopy and that the PCP recommended that the patient call Dr. Amrita Fox before she \"crashes completely. \"  The patient also stated that last August her Ferritin was up to 31 and now it is 24 and she is unable to \"do anything\" except sit around in order to keep her iron up. The patient wanted to know what the next move is and if Dr. Amrita Fox recommends that she have the colonoscopy or continue to take iron or come in and repeat some labs or tests. The patient also stated that she is unable to eat red meat due to a condition she has, that her stomach \"burns up\" after eating certain foods but does not have reflux, she has pain in her back and that her PCP recommended an ultrasound which \"everything checked out\" and it did not show any kidney stones stones and that \"everything looked good\" but now they recommend that they check her gall bladder with dye which she will schedule as well and inquired if Dr. Amrita Fox recommends that she see her before or after her gall bladder is checked.   The patient also stated that when they took her blood for labs it \"wiped her out\" and the patient stated that is not normal.  The patient also stated that she \"dropped back\" on eating some greens and that she was not eating as much spinach but she is eating more greens now but it is difficult to eat enough turkey, fish and greens to have her iron levels back up to where they need to be. The patient also stated that she does not want to do another bone marrow biopsy but wanted to see if there was any way to see what is \"wrong\" by other tests and that any activity uses up what iron her body is making and that she believes that her gall bladder issues are due to her inactivity. The patient also stated that the issues with her counts and the weakness and fatigue have been going on for two years and that even though she is stronger than two years ago she is just unable to \"get to the last spot. \"  The patient also stated that her last biopsy results showed that she had scarring in her bone marrow and they are not sure why and that infectious diseases never called her back and she called back to follow up but still has not heard from them. Informed the patient that Dr. Taiwo Quinonez recommends that she have the colonoscopy done and that once the results are back Dr. Taiwo Quinonez recommends that the patient schedule an appointment to see him and that he will review the results and they can discuss her plan of care. Also, informed the patient that per Dr Taiwo Quinonez she does not have to have her gall bladder checked before seeing him. The patient verbalized understanding and denied any further questions or concerns.

## 2018-06-18 ENCOUNTER — OFFICE VISIT (OUTPATIENT)
Dept: OBGYN CLINIC | Age: 53
End: 2018-06-18

## 2018-06-18 VITALS
DIASTOLIC BLOOD PRESSURE: 60 MMHG | SYSTOLIC BLOOD PRESSURE: 116 MMHG | HEIGHT: 68 IN | WEIGHT: 153 LBS | BODY MASS INDEX: 23.19 KG/M2

## 2018-06-18 DIAGNOSIS — Z01.419 ENCOUNTER FOR GYNECOLOGICAL EXAMINATION (GENERAL) (ROUTINE) WITHOUT ABNORMAL FINDINGS: Primary | ICD-10-CM

## 2018-06-18 DIAGNOSIS — Z79.890 HORMONE REPLACEMENT THERAPY: ICD-10-CM

## 2018-06-18 RX ORDER — PROGESTERONE 100 MG/1
100 CAPSULE ORAL DAILY
Qty: 90 CAP | Refills: 4 | Status: SHIPPED | OUTPATIENT
Start: 2018-06-18

## 2018-06-18 NOTE — PATIENT INSTRUCTIONS
Breast Self-Exam: Care Instructions  Your Care Instructions    A breast self-exam is when you check your breasts for lumps or changes. This regular exam helps you learn how your breasts normally look and feel. Most breast problems or changes are not because of cancer. Breast self-exam is not a substitute for a mammogram. Having regular breast exams by your doctor and regular mammograms improve your chances of finding any problems with your breasts. Some women set a time each month to do a step-by-step breast self-exam. Other women like a less formal system. They might look at their breasts as they brush their teeth, or feel their breasts once in a while in the shower. If you notice a change in your breast, tell your doctor. Follow-up care is a key part of your treatment and safety. Be sure to make and go to all appointments, and call your doctor if you are having problems. It's also a good idea to know your test results and keep a list of the medicines you take. How do you do a breast self-exam?  · The best time to examine your breasts is usually one week after your menstrual period begins. Your breasts should not be tender then. If you do not have periods, you might do your exam on a day of the month that is easy to remember. · To examine your breasts:  ¨ Remove all your clothes above the waist and lie down. When you are lying down, your breast tissue spreads evenly over your chest wall, which makes it easier to feel all your breast tissue. ¨ Use the pads-not the fingertips-of the 3 middle fingers of your left hand to check your right breast. Move your fingers slowly in small coin-sized circles that overlap. ¨ Use three levels of pressure to feel of all your breast tissue. Use light pressure to feel the tissue close to the skin surface. Use medium pressure to feel a little deeper. Use firm pressure to feel your tissue close to your breastbone and ribs.  Use each pressure level to feel your breast tissue before moving on to the next spot. ¨ Check your entire breast, moving up and down as if following a strip from the collarbone to the bra line, and from the armpit to the ribs. Repeat until you have covered the entire breast.  ¨ Repeat this procedure for your left breast, using the pads of the 3 middle fingers of your right hand. · To examine your breasts while in the shower:  ¨ Place one arm over your head and lightly soap your breast on that side. ¨ Using the pads of your fingers, gently move your hand over your breast (in the strip pattern described above), feeling carefully for any lumps or changes. ¨ Repeat for the other breast.  · Have your doctor inspect anything you notice to see if you need further testing. Where can you learn more? Go to http://yordan-nancy.info/. Enter P148 in the search box to learn more about \"Breast Self-Exam: Care Instructions. \"  Current as of: May 12, 2017  Content Version: 11.4  © 2377-3697 [employer]. Care instructions adapted under license by [employer] (which disclaims liability or warranty for this information). If you have questions about a medical condition or this instruction, always ask your healthcare professional. [place] [address] any warranty or liability for your use of this information.

## 2018-06-18 NOTE — PROGRESS NOTES
Rodger Marcano is a50 y.o. female [facility]  who presents for her annual checkup. She is having some L breast pain. She is currently taking compounded estrogen. She notices her L breast pain is reduced when she reduces the # of clicks she uses. She would like her estrogen level checked today. Menstrual status:    Her periods are absent in flow. She denies dysmenorrhea. She reports no premenstrual symptoms. The patient is not using HRT. Contraception:    The current method of family planning is none. Sexual history:    She  reports that she currently engages in sexual activity and has had male partners. She reports using the following method of birth control/protection: None. Medical conditions:    Since her last annual GYN exam about two years ago (5/24/2016), she has had the following changes in her health history: none. Pap and Mammogram History:    Her most recent Pap smear was normal, HPV neg obtained 5/24/2016. The patient has not had a recent mammogram. She does not wish to have mammograms every year. Breast Cancer History/Substance Abuse:    She has no family history of breast cancer. Osteoporosis History:    Family history does not include a first or second degree relative with osteopenia or osteoporosis. She is not currently taking calcium and vit D. Past Medical History:   Diagnosis Date    Anemia     Arrhythmia     EBV positive mononucleosis syndrome 7/22/2009   [place] infection     Hypoglycemia     Hypotension     Neurological disorder     annurysm     Parasite infection     Shingles 2006    Vasovagal syncope 10/2/2016    Zoster 7/22/2009     History reviewed. No pertinent surgical history. Current Outpatient Prescriptions   Medication Sig Dispense Refill    cholecalciferol (VITAMIN D3) 1,000 unit cap Take 2,500 Units by mouth every seven (7) days.       LACTOBACILLUS COMBO NO.6 (PROBIOTIC COMPLEX PO) Take 1 Tab by mouth daily. Allergies: Amoxicillin; Benadr [diphenhydramine hcl]; Diphenhydramine; Sulfa (sulfonamide antibiotics); and Sulfasalazine   Social History     Social History    Marital status:      Spouse name: N/A    Number of children: N/A    Years of education: N/A     Occupational History    Not on file. Social History Main Topics    Smoking status: Never Smoker    Smokeless tobacco: Never Used    Alcohol use Yes    Drug use: No    Sexual activity: Yes     Partners: Male     Birth control/ protection: None     Other Topics Concern    Not on file     Social History Narrative     Tobacco History:  reports that she has never smoked. She has never used smokeless tobacco.  Alcohol Abuse:  reports that she drinks alcohol. Drug Abuse:  reports that she does not use illicit drugs.   Patient Active Problem List   Diagnosis Code    EBV positive mononucleosis syndrome B27.00    Zoster B02.9    Cerebral aneurysm, nonruptured I67.1    Vasovagal syncope R55         Review of Systems - History obtained from the patient  Constitutional: negative for weight loss, fever, night sweats  HEENT: negative for hearing loss, earache, congestion, snoring, sorethroat  CV: negative for chest pain, palpitations, edema  Resp: negative for cough, shortness of breath, wheezing  GI: negative for change in bowel habits, abdominal pain, black or bloody stools  : negative for frequency, dysuria, hematuria, vaginal discharge  MSK: negative for back pain, joint pain, muscle pain  Breast: negative for breast lumps, nipple discharge, galactorrhea  Skin :negative for itching, rash, hives  Neuro: negative for dizziness, headache, confusion, weakness  Psych: negative for anxiety, depression, change in mood  Heme/lymph: negative for bleeding, bruising, pallor    Physical Exam    Visit Vitals    /60 (BP 1 Location: Left arm, BP Patient Position: Sitting)    Ht 5' 8\" (1.727 m)    Wt 153 lb (69.4 kg)    BMI 23.26 kg/m2 Constitutional  · Appearance: well-nourished, well developed, alert, in no acute distress    HENT  · Head and Face: appears normal    Neck  · Inspection/Palpation: normal appearance, no masses or tenderness  · Lymph Nodes: no lymphadenopathy present  · Thyroid: gland size normal, nontender, no nodules or masses present on palpation    Chest  · Respiratory Effort: breathing normal  · Auscultation: normal breath sounds    Cardiovascular  · Heart:  · Auscultation: regular rate and rhythm without murmur    Breasts  · Inspection of Breasts: breasts symmetrical, no skin changes, no discharge present, nipple appearance normal, no skin retraction present  · Palpation of Breasts and Axillae: no masses present on palpation, no breast tenderness  · Axillary Lymph Nodes: no lymphadenopathy present    Gastrointestinal  · Abdominal Examination: abdomen non-tender to palpation, normal bowel sounds, no masses present  · Liver and spleen: no hepatomegaly present, spleen not palpable  · Hernias: no hernias identified    Skin  · General Inspection: no rash, no lesions identified    Neurologic/Psychiatric  · Mental Status:  · Orientation: grossly oriented to person, place and time  · Mood and Affect: mood normal, affect appropriate    Genitourinary  · External Genitalia: normal appearance for age, no discharge present, no tenderness present, no inflammatory lesions present, no masses present, no atrophy present  · Vagina: normal vaginal vault without central or paravaginal defects, no discharge present, no inflammatory lesions present, no masses present  · Bladder: non-tender to palpation  · Urethra: appears normal  · Cervix: normal   · Uterus: normal size, shape and consistency  · Adnexa: no adnexal tenderness present, no adnexal masses present  · Perineum: perineum within normal limits, no evidence of trauma, no rashes or skin lesions present  · Anus: anus within normal limits, no hemorrhoids present  · Inguinal Lymph Nodes: no lymphadenopathy present    Assessment:  Routine gynecologic examination  Her current medical status is satisfactory with no evidence of significant gynecologic issues. Menopausal sx  Plan:  Counseled re: diet, exercise, healthy lifestyle  Return for yearly wellness visits  Rec annual mammogram  Pap/HPV  Long disc with patient about compounded HRT, risks, etc. Explained that checking levels has no clinical value. We do not treat to premenopausal levels. Meds used that are FDA approved are biologically equivalent to the body. Will start Prometrium and Divigel. Pt desires to have progesterone and estrogen levels checked today.  She also requests iron panel due to hx of anemia

## 2018-06-19 ENCOUNTER — TELEPHONE (OUTPATIENT)
Dept: OBGYN CLINIC | Age: 53
End: 2018-06-19

## 2018-06-19 LAB
ERYTHROCYTE [DISTWIDTH] IN BLOOD BY AUTOMATED COUNT: 13 % (ref 12.3–15.4)
ESTRADIOL SERPL-MCNC: <5 PG/ML
FERRITIN SERPL-MCNC: 54 NG/ML (ref 15–150)
HCT VFR BLD AUTO: 37.1 % (ref 34–46.6)
HGB BLD-MCNC: 12 G/DL (ref 11.1–15.9)
IRON SATN MFR SERPL: 23 % (ref 15–55)
IRON SERPL-MCNC: 72 UG/DL (ref 27–159)
MCH RBC QN AUTO: 31.1 PG (ref 26.6–33)
MCHC RBC AUTO-ENTMCNC: 32.3 G/DL (ref 31.5–35.7)
MCV RBC AUTO: 96 FL (ref 79–97)
PLATELET # BLD AUTO: 310 X10E3/UL (ref 150–379)
PROGEST SERPL-MCNC: 0.3 NG/ML
RBC # BLD AUTO: 3.86 X10E6/UL (ref 3.77–5.28)
TIBC SERPL-MCNC: 313 UG/DL (ref 250–450)
UIBC SERPL-MCNC: 241 UG/DL (ref 131–425)
WBC # BLD AUTO: 4.4 X10E3/UL (ref 3.4–10.8)

## 2018-06-19 NOTE — TELEPHONE ENCOUNTER
Patient aware that we will work to try and get the labs added and if not she will have to come in for additional blood work. Dr. Elie Gudino' nurse will reach out to the lab to see if these can be added.

## 2018-06-19 NOTE — TELEPHONE ENCOUNTER
Patient calling stating that she was seen by Dr. Hay Watts yesterday and had a hormone panel drawn and she forgot to ask Dr. Hay Watts to add a testosterone and Dhea panel along with it. Patient states that she knows the lab will hold the blood for a certain amount of days and hope that this can be added. Please advise.

## 2018-06-19 NOTE — TELEPHONE ENCOUNTER
MD Bean Robison LPN;  [facility]        Caller: Unspecified (Today,  2:08 PM)              Have April order the sexy panel and dhea

## 2018-06-19 NOTE — TELEPHONE ENCOUNTER
Called [facility] and requested to add [id number] DHEA, [id number] testosterone and [id number] albumin. She will fax an authorization for us to sign and fax back. They will let me know if these tests cannot be added.

## 2018-06-26 ENCOUNTER — TELEPHONE (OUTPATIENT)
Dept: OBGYN CLINIC | Age: 53
End: 2018-06-26

## 2018-06-26 NOTE — TELEPHONE ENCOUNTER
April called that day and added them. I do not see any results. Which may mean they could not add them.  Please inform patient April is going to call them today

## 2018-06-26 NOTE — TELEPHONE ENCOUNTER
Patient calling to determine if the extra labs for DHEA and Testosterone were supposed to have been added. Patient wanting to know what the results are.     Please contact patient back at [phone]

## 2018-06-26 NOTE — TELEPHONE ENCOUNTER
I just called [facility] and spoke with [employer]. She states their system is currently down, but she will check on the labs I added on 6/19/18 and let me know.

## 2018-07-06 NOTE — TELEPHONE ENCOUNTER
Laxmi Baez did not send me anything??? Even after my 2nd call. I am going to say they were unable to add these labs on. She can stop by the office if she wants them drawn.

## 2018-07-06 NOTE — TELEPHONE ENCOUNTER
Patient aware that the testing was not added. Patient state she is going to see her PCP today and will be having blood work done then and will have her PCP order the Testosterone and DHEA blood work.

## 2018-07-12 LAB
ALBUMIN SERPL-MCNC: 4.6 G/DL (ref 3.5–5.5)
DHEA-S SERPL-MCNC: 51.9 UG/DL (ref 41.2–243.7)
SPECIMEN STATUS REPORT, ROLRST: NORMAL
TESTOST SERPL-MCNC: <3 NG/DL (ref 3–41)

## 2018-07-24 ENCOUNTER — TELEPHONE (OUTPATIENT)
Dept: FAMILY MEDICINE CLINIC | Age: 53
End: 2018-07-24

## 2018-07-24 NOTE — TELEPHONE ENCOUNTER
Pt called stating she saw Dr. Juvenal Odom last year after  bone marrow transplant, has been seeing Pcp Dr. Tiff Beach and wants call back from Dr. Juvenal Odom to discuss her labs from last year at [phone]. Pt says she's called several times but has not received call back. Pt advised Dr. Juvenal Odom is only in our office on Wednesday afternoons due to rounding in hospital and will give message to him to call her. Pt agrees to plan.  Mariano

## 2018-12-21 ENCOUNTER — APPOINTMENT (OUTPATIENT)
Dept: OBGYN CLINIC | Age: 53
End: 2018-12-21

## 2020-01-16 ENCOUNTER — TELEPHONE (OUTPATIENT)
Dept: NEUROSURGERY | Age: 55
End: 2020-01-16

## 2020-01-16 DIAGNOSIS — I67.1 CEREBRAL ANEURYSM, NONRUPTURED: Primary | Chronic | ICD-10-CM

## 2020-01-16 NOTE — TELEPHONE ENCOUNTER
Patient requesting order for MRA. New order received from provider and patient informed patient of new order and number to [address] to schedule MRA.

## 2020-01-24 ENCOUNTER — OFFICE VISIT (OUTPATIENT)
Dept: NEUROSURGERY | Age: 55
End: 2020-01-24

## 2020-01-24 VITALS
TEMPERATURE: 98.4 F | OXYGEN SATURATION: 98 % | SYSTOLIC BLOOD PRESSURE: 104 MMHG | HEART RATE: 83 BPM | HEIGHT: 68 IN | WEIGHT: 159.2 LBS | RESPIRATION RATE: 16 BRPM | BODY MASS INDEX: 24.13 KG/M2 | DIASTOLIC BLOOD PRESSURE: 70 MMHG

## 2020-01-24 DIAGNOSIS — I67.1 CEREBRAL ANEURYSM: Primary | ICD-10-CM

## 2020-01-24 RX ORDER — BISMUTH SUBSALICYLATE 262 MG
1 TABLET,CHEWABLE ORAL DAILY
COMMUNITY

## 2020-01-24 NOTE — PROGRESS NOTES
Annual follow up for cerebral aneurysm and double vision. Patient reports for past 4 -5 months she has experienced double vision and occasional dizziness. She has seen opthamalogy and they see no issues. Reports increased sensitivity to light and a black shade comes over her eyes at times. Denies headaches. Reports she has ocular migraines.

## 2020-01-24 NOTE — PROGRESS NOTES
Neurointerventional Surgery  Ambulatory Progress Note      Patient: Mamta Kelsey MRN: [id number]  SSN: [id number]    YOB: 1965  Age: 53 y.o. Sex: female      History of Present Illness:      Ms. Magnus Florence is a 52 yo female who was seen by Dr. Osman Martinez in 2015 for evaluation of a 2 mm left cavernous carotid aneurysm. At that time, she had presented to ER (8/7/2015) for acute onset of tremors, neck pain, generalized fatigued with lightheadedness. Pt underwent a CTA of the head and neck that revealed the tiny aneurysm. No other significant vascular findings were noted. At that time, her PMH included EBV and shingles. Pt has intermittent chronic hx of ocular migraines that appear to have changed in character over the years. In 2015 she described vision loss for several minutes followed by mild central frontal headache. She was to return in 2016 for a follow up MRA, but did not keep that appointment due to illness. She was contacted by this office in 2017 to schedule a follow up study, but at that time she had [place] mountain fever with multiple complications, and was hesitant to have an MRI with contrast. She was to call when she had recovered to schedule a TOF MRA but did not do so. She was evaluated for fatigue and anemia in 2017 and underwent a BM biopsy. She is followed by Dr. Evangelista Almazan who at that time found that her anemia and leukopenia had resolved, her  was stephen. She had positive titers for RMSF, lyme and beef allergies and was referred to ID for further testing. 1. Anemia:  Overall resolved; she is eating baby marisol and eating very healthy. She also has MTHFR homozygous  Mutation which was thought to be contributing to her anemia. Ms. Magnus Florence is here to have her aneurysm surveillance examination. However, since we had not seen her for over three years, she needed a clinic visit.   At this visit she has noticed a change in her ocular migraine pattern. She now states her vision had become a a kaleidoscope of colors with no headache, and also describes new episodes of unilateral blindness--\"like a shade falling down\"-- that has occurred individually over each eye and once over both eyes. She also describes intermittent diplopia and sensitivity to sudden bright like as if [place] flashbulb has gone off. \"  She also notes increased floaters. She has seen ophthalmology with no definitive diagnosis. She has noticed some changes in the coloration of her skin with flat, small, circular areas of lack of pigmentation, and some small red bumps that bleed easily when scratched. Review of Systems    A comprehensive ROS was performed and was negative except for as per HPI. Objective:     Current Outpatient Medications   Medication Sig Dispense Refill    multivitamin (ONE A DAY) tablet Take 1 Tab by mouth daily.  OTHER B12, Methylcobalamin, daily      LACTOBACILLUS COMBO NO.6 (PROBIOTIC COMPLEX PO) Take 1 Tab by mouth daily.  OTHER 1 mg nightly. Indications: apply 9.7QQ-4 clicks at bedtime; Est/Prog 1.0/60mg/GN cream--compound pharm      progesterone (PROMETRIUM) 100 mg capsule Take 1 Cap by mouth daily. 90 Cap 4    cholecalciferol (VITAMIN D3) 1,000 unit cap Take 2,500 Units by mouth every seven (7) days. Visit Vitals  /70 (BP 1 Location: Left arm)   Pulse 83   Temp 98.4 °F (36.9 °C)   Resp 16   Ht 5' 8\" (1.727 m)   Wt 159 lb 3.2 oz (72.2 kg)   SpO2 98%   BMI 24.21 kg/m²       Allergies   Allergen Reactions    Amoxicillin Nausea and Vomiting    Benadr [Diphenhydramine Hcl] Rash    Diphenhydramine Unknown (comments)    Sulfa (Sulfonamide Antibiotics) Hives    Sulfasalazine Unknown (comments)       Current Outpatient Medications   Medication Sig    multivitamin (ONE A DAY) tablet Take 1 Tab by mouth daily.  OTHER B12, Methylcobalamin, daily    LACTOBACILLUS COMBO NO.6 (PROBIOTIC COMPLEX PO) Take 1 Tab by mouth daily.     OTHER 1 mg nightly. Indications: apply 9.7DB-5 clicks at bedtime; Est/Prog 1.0/60mg/GN cream--compound pharm    progesterone (PROMETRIUM) 100 mg capsule Take 1 Cap by mouth daily.  cholecalciferol (VITAMIN D3) 1,000 unit cap Take 2,500 Units by mouth every seven (7) days. No current facility-administered medications for this visit. Physical Exam:  General: NAD  Lungs: non-labored breathing on room air  Heart: RRR  Extremities: no cyanosis or edema  Skin: small white (lack of pigment) areas over arms and legs, scattered scabs on skin with underlying reddish areas; no rohit hemangiomas seen    Neurologic Exam:  Mental Status:  Alert and oriented x 4. Appropriate affect, mood and behavior. Language:    Normal fluency,comprehension    Cranial Nerves:   Pupils equal, round      Visual fields full to confrontation. Extraocular movements intact. No nystagmus. Facial sensation intact V1 - V3. Full facial strength, no asymmetry. No dysarthria. Tongue protrudes to midline, palate elevates symmetrically. Shoulder shrug 5/5 bilaterally. Motor:    No pronator drift. Bulk and tone normal.      5/5 power in all extremities proximally and distally. No involuntary movements. Sensation:    Sensation intact throughout to light touch. Coordination & Gait: Normal, tandem gait normal, FTN and HTS intact.         Labs:  Lab Results   Component Value Date/Time    Sodium 137 09/04/2016 10:42 PM    Potassium 3.7 09/04/2016 10:42 PM    Chloride 104 09/04/2016 10:42 PM    CO2 26 09/04/2016 10:42 PM    Anion gap 7 09/04/2016 10:42 PM    Glucose 114 (H) 09/04/2016 10:42 PM    BUN 9 09/04/2016 10:42 PM    Creatinine 0.97 09/04/2016 10:42 PM    BUN/Creatinine ratio 9 (L) 09/04/2016 10:42 PM    GFR est AA >60 09/04/2016 10:42 PM    GFR est non-AA >60 09/04/2016 10:42 PM    Calcium 8.8 09/04/2016 10:42 PM     Lab Results   Component Value Date/Time    WBC 4.4 06/18/2018 04:16 PM Hemoglobin (POC) 11.9 01/09/2014 10:53 PM    HGB 12.0 06/18/2018 04:16 PM    Hematocrit (POC) 35 01/09/2014 10:53 PM    HCT 37.1 06/18/2018 04:16 PM    PLATELET 870 31/97/2079 04:16 PM    MCV 96 06/18/2018 04:16 PM     Lab Results   Component Value Date/Time    MCH 31.1 06/18/2018 04:16 PM    MCHC 32.3 06/18/2018 04:16 PM    BASOPHILS 1 06/13/2017 09:14 AM    ABS. LYMPHOCYTES 1.4 06/13/2017 09:14 AM    ABS. MONOCYTES 0.4 06/13/2017 09:14 AM    ABS. EOSINOPHILS 0.3 06/13/2017 09:14 AM    ABS. BASOPHILS 0.0 06/13/2017 09:14 AM    Phosphorus 2.7 02/23/2016 04:56 AM    Magnesium 2.0 02/23/2016 04:56 AM       IMAGING:    No recent neuroimaging    [facility]    Result Date: 1/3/2018  IMPRESSION:  No gallstones or biliary ductal dilatation. No right hydronephrosis. Assessment/Plan:     I have personally seen and examined the patient. I have personally reviewed the chart and images. Elements of my examination included history of present illness, review of systems, review of past medical and surgical history, review of medications, and physical and neurological examination. In clinic, I had a long conversation with the patient regarding her tiny left cavernous carotid aneurysm and we reviewed her CTA from 2015. We discussed the natural history of cavernous carotid aneurysms, and I noted that it is unlikely this aneurysm is causing any of her ophthalmologic symptoms. I noted that given her history of multiple tick-borne infections I would like to see a brain MRI to insure that she does not have white matter changes. I would also obtain an MRA to evaluate the aneurysm, and insure it has not grown. At this point, the patient has agreed to the proposed imaging, we will see her back in clinic to discuss results of those studies. The patient expressed an understanding of and agreement with our management plan, and all questions were answered.      The patient expressed understanding of the disease process and management plan, and all questions were answered. 45 minutes were spent in patient management, of which more than half was spent in counseling and coordination of care.       Mili Johnson M.D.    Sera Hayden  Professor, Departments of Radiology, Neurology and Neurological Surgery  [facility]

## 2020-01-24 NOTE — PATIENT INSTRUCTIONS
A Healthy Lifestyle: Care Instructions  Your Care Instructions    A healthy lifestyle can help you feel good, stay at a healthy weight, and have plenty of energy for both work and play. A healthy lifestyle is something you can share with your whole family. A healthy lifestyle also can lower your risk for serious health problems, such as high blood pressure, heart disease, and diabetes. You can follow a few steps listed below to improve your health and the health of your family. Follow-up care is a key part of your treatment and safety. Be sure to make and go to all appointments, and call your doctor if you are having problems. It's also a good idea to know your test results and keep a list of the medicines you take. How can you care for yourself at home? · Do not eat too much sugar, fat, or fast foods. You can still have dessert and treats now and then. The goal is moderation. · Start small to improve your eating habits. Pay attention to portion sizes, drink less juice and soda pop, and eat more fruits and vegetables. ? Eat a healthy amount of food. A 3-ounce serving of meat, for example, is about the size of a deck of cards. Fill the rest of your plate with vegetables and whole grains. ? Limit the amount of soda and sports drinks you have every day. Drink more water when you are thirsty. ? Eat at least 5 servings of fruits and vegetables every day. It may seem like a lot, but it is not hard to reach this goal. A serving or helping is 1 piece of fruit, 1 cup of vegetables, or 2 cups of leafy, raw vegetables. Have an apple or some carrot sticks as an afternoon snack instead of a candy bar. Try to have fruits and/or vegetables at every meal.  · Make exercise part of your daily routine. You may want to start with simple activities, such as walking, bicycling, or slow swimming. Try to be active 30 to 60 minutes every day. You do not need to do all 30 to 60 minutes all at once.  For example, you can exercise 3 times a day for 10 or 20 minutes. Moderate exercise is safe for most people, but it is always a good idea to talk to your doctor before starting an exercise program.  · Keep moving. Olena Araya the lawn, work in the garden, or [employer]. Take the stairs instead of the elevator at work. · If you smoke, quit. People who smoke have an increased risk for heart attack, stroke, cancer, and other lung illnesses. Quitting is hard, but there are ways to boost your chance of quitting tobacco for good. ? Use nicotine gum, patches, or lozenges. ? Ask your doctor about stop-smoking programs and medicines. ? Keep trying. In addition to reducing your risk of diseases in the future, you will notice some benefits soon after you stop using tobacco. If you have shortness of breath or asthma symptoms, they will likely get better within a few weeks after you quit. · Limit how much alcohol you drink. Moderate amounts of alcohol (up to 2 drinks a day for men, 1 drink a day for women) are okay. But drinking too much can lead to liver problems, high blood pressure, and other health problems. Family health  If you have a family, there are many things you can do together to improve your health. · Eat meals together as a family as often as possible. · Eat healthy foods. This includes fruits, vegetables, lean meats and dairy, and whole grains. · Include your family in your fitness plan. Most people think of activities such as jogging or tennis as the way to fitness, but there are many ways you and your family can be more active. Anything that makes you breathe hard and gets your heart pumping is exercise. Here are some tips:  ? Walk to do errands or to take your child to school or the bus.  ? Go for a family bike ride after dinner instead of watching TV. Where can you learn more? Go to http://yordan-nancy.info/. Enter Z569 in the search box to learn more about \"A Healthy Lifestyle: Care Instructions. \"  Current as of: May 28, 2019  Content Version: 12.2  © 6297-7737 [employer]. Care instructions adapted under license by [employer] (which disclaims liability or warranty for this information). If you have questions about a medical condition or this instruction, always ask your healthcare professional. [address] any warranty or liability for your use of this information.

## 2020-02-06 ENCOUNTER — HOSPITAL ENCOUNTER (OUTPATIENT)
Dept: MRI IMAGING | Age: 55
Discharge: HOME | End: 2020-02-06
Attending: RADIOLOGY
Payer: COMMERCIAL

## 2020-02-06 ENCOUNTER — TELEPHONE (OUTPATIENT)
Dept: NEUROSURGERY | Age: 55
End: 2020-02-06

## 2020-02-06 DIAGNOSIS — I67.1 CEREBRAL ANEURYSM: ICD-10-CM

## 2020-02-06 PROCEDURE — 70551 MRI BRAIN STEM W/O DYE: CPT

## 2020-02-06 PROCEDURE — 70544 MR ANGIOGRAPHY HEAD W/O DYE: CPT

## 2020-02-06 NOTE — TELEPHONE ENCOUNTER
Return call to patient regarding her insurance company requesting a peer to peer for MRA & MRI brain. Informed patient a message was sent to provider and I spoke with provider. Provider will call insurance company for [facility].   Advised patient to call facility prior to her appointment tonight at 8 pm.

## 2020-02-07 NOTE — TELEPHONE ENCOUNTER
Spoke to patient and informed her provider had a peer to peer with her insurance company. Approval was granted for MRA and MRI per provider. Authorization # [id number].

## 2020-02-10 ENCOUNTER — TELEPHONE (OUTPATIENT)
Dept: NEUROSURGERY | Age: 55
End: 2020-02-10

## 2020-02-10 NOTE — TELEPHONE ENCOUNTER
Called Ms. Gris Ahn with the results of her MRI/MRA. No change in tiny left cavernous carotid aneurysm. No MRI findings of significance--specifically no evidence of infectious or inflammatory WM changes. Still has questions about the visual changes she has been experiencing--diplopia, shade falling down over her eye or eyes. CTA neck from 2015 shows minimal carotid bifurcation changes. She recently had a CD done at outside hospital.  She will drop a disc off for me to review. Recommended she see Dr. Damian Lebron for her neurological symptoms.   Andres Sotelo M.D.

## 2021-03-22 NOTE — PROGRESS NOTES
Flor Medina is a No obstetric history on file. ,  63 y.o. female WHITE whose LMP was on  who presents for her annual checkup. She is having {problem:45166}. Menstrual status: 
 
Her periods are absent She denies dysmenorrhea. She reports no premenstrual symptoms. The patient is not using HRT. Contraception: The current method of family planning is post menopausal status. Sexual history: She  reports being sexually active and has had partner(s) who are Male. She reports using the following method of birth control/protection: None. Medical conditions: 
 
Since her last annual GYN exam about three or more years ago, she has had the following changes in her health history: none. Pap and Mammogram History: 
 
Her most recent Pap smear was 5/24/2016 normal/HPV neg The patient had her mammogram today in our office. Breast Cancer History/Substance Abuse: She has no  family history of breast cancer. Osteoporosis History: 
 
Family history does not include a first or second degree relative with osteopenia or osteoporosis. Past Medical History:  
Diagnosis Date  Anemia  Arrhythmia  EBV positive mononucleosis syndrome 7/22/2009 Alyssa Rapp infection  Fatigue  Headache   
 migraines  Homozygous for MTHFR gene mutation ([facility] [address]  Hypoglycemia  Hypotension  Neurological disorder   
 annurysm  Parasite infection  Rash  Shingles 2006  Skipped beats  Vasovagal syncope 10/2/2016  Vertigo  Vision decreased  Zoster 7/22/2009 No past surgical history on file. Current Outpatient Medications Medication Sig Dispense Refill  multivitamin (ONE A DAY) tablet Take 1 Tab by mouth daily.  OTHER B12, Methylcobalamin, daily  OTHER 1 mg nightly. Indications: apply 7.9YH-3 clicks at bedtime; Est/Prog 1.0/60mg/GN cream--compound pharm  progesterone (PROMETRIUM) 100 mg capsule Take 1 Cap by mouth daily.  [address] Cap 4  cholecalciferol (VITAMIN D3) 1,000 unit cap Take 2,500 Units by mouth every seven (7) days.  LACTOBACILLUS COMBO NO.6 (PROBIOTIC COMPLEX PO) Take 1 Tab by mouth daily. Allergies: Amoxicillin, Benadr [diphenhydramine hcl], Diphenhydramine, Sulfa (sulfonamide antibiotics), and Sulfasalazine Social History Socioeconomic History  Marital status:  Spouse name: Not on file  Number of children: Not on file  Years of education: Not on file  Highest education level: Not on file Occupational History  Not on file Social Needs  Financial resource strain: Not on file  Food insecurity Worry: Not on file Inability: Not on file  Transportation needs Medical: Not on file Non-medical: Not on file Tobacco Use  Smoking status: Never Smoker  Smokeless tobacco: Never Used Substance and Sexual Activity  Alcohol use: Yes  Drug use: No  
 Sexual activity: Yes  
  Partners: Male Birth control/protection: None Lifestyle  Physical activity Days per week: Not on file Minutes per session: Not on file  Stress: Not on file Relationships  Social connections Talks on phone: Not on file Gets together: Not on file Attends Sabianist service: Not on file Active member of club or organization: Not on file Attends meetings of clubs or organizations: Not on file Relationship status: Not on file  Intimate partner violence Fear of current or ex partner: Not on file Emotionally abused: Not on file Physically abused: Not on file Forced sexual activity: Not on file Other Topics Concern  Not on file Social History Narrative  Not on file Tobacco History:  reports that she has never smoked. She has never used smokeless tobacco. 
Alcohol Abuse:  reports current alcohol use. Drug Abuse:  reports no history of drug use. Patient Active Problem List  
Diagnosis Code  EBV positive mononucleosis syndrome B27.00  Zoster B02.9  Cerebral aneurysm, nonruptured I67.1  Vasovagal syncope R55 Review of Systems - History obtained from the patient Constitutional: negative for weight loss, fever, night sweats HEENT: negative for hearing loss, earache, congestion, snoring, sorethroat CV: negative for chest pain, palpitations, edema Resp: negative for cough, shortness of breath, wheezing GI: negative for change in bowel habits, abdominal pain, black or bloody stools : negative for frequency, dysuria, hematuria, vaginal discharge MSK: negative for back pain, joint pain, muscle pain Breast: negative for breast lumps, nipple discharge, galactorrhea Skin :negative for itching, rash, hives Neuro: negative for dizziness, headache, confusion, weakness Psych: negative for anxiety, depression, change in mood Heme/lymph: negative for bleeding, bruising, pallor Physical Exam 
 
There were no vitals taken for this visit. Constitutional 
· Appearance: well-nourished, well developed, alert, in no acute distress HENT 
· Head and Face: appears normal 
 
Neck · Inspection/Palpation: normal appearance, no masses or tenderness · Lymph Nodes: no lymphadenopathy present · Thyroid: gland size normal, nontender, no nodules or masses present on palpation Chest 
· Respiratory Effort: breathing normal 
· Auscultation: normal breath sounds Cardiovascular · Heart: 
· Auscultation: regular rate and rhythm without murmur Breasts · Inspection of Breasts: breasts symmetrical, no skin changes, no discharge present, nipple appearance normal, no skin retraction present · Palpation of Breasts and Axillae: no masses present on palpation, no breast tenderness · Axillary Lymph Nodes: no lymphadenopathy present Gastrointestinal 
· Abdominal Examination: abdomen non-tender to palpation, normal bowel sounds, no masses present · Liver and spleen: no hepatomegaly present, spleen not palpable · Hernias: no hernias identified Skin · General Inspection: no rash, no lesions identified Neurologic/Psychiatric · Mental Status: · Orientation: grossly oriented to person, place and time · Mood and Affect: mood normal, affect appropriate Genitourinary · External Genitalia: normal appearance for age, no discharge present, no tenderness present, no inflammatory lesions present, no masses present, no atrophy present · Vagina: normal vaginal vault without central or paravaginal defects, no discharge present, no inflammatory lesions present, no masses present · Bladder: non-tender to palpation · Urethra: appears normal 
· Cervix: normal  
· Uterus: normal size, shape and consistency · Adnexa: no adnexal tenderness present, no adnexal masses present · Perineum: perineum within normal limits, no evidence of trauma, no rashes or skin lesions present · Anus: anus within normal limits, no hemorrhoids present · Inguinal Lymph Nodes: no lymphadenopathy present Assessment: 
Routine gynecologic examination Her current medical status is satisfactory with no evidence of significant gynecologic issues. Plan: 
Counseled re: diet, exercise, healthy lifestyle Return for yearly wellness visits Rec annual mammogram

## 2021-03-23 ENCOUNTER — HOSPITAL ENCOUNTER (OUTPATIENT)
Dept: MAMMOGRAPHY | Age: 56
Discharge: HOME | End: 2021-03-23
Attending: OBSTETRICS & GYNECOLOGY
Payer: COMMERCIAL

## 2021-03-23 ENCOUNTER — OFFICE VISIT (OUTPATIENT)
Dept: OBGYN CLINIC | Age: 56
End: 2021-03-23

## 2021-03-23 ENCOUNTER — TRANSCRIBE ORDER (OUTPATIENT)
Dept: SCHEDULING | Age: 56
End: 2021-03-23

## 2021-03-23 DIAGNOSIS — Z12.31 VISIT FOR SCREENING MAMMOGRAM: ICD-10-CM

## 2021-03-23 DIAGNOSIS — Z12.31 VISIT FOR SCREENING MAMMOGRAM: Primary | ICD-10-CM

## 2021-03-23 PROCEDURE — 77063 BREAST TOMOSYNTHESIS BI: CPT

## 2023-05-11 RX ORDER — PROGESTERONE 100 MG/1
CAPSULE ORAL DAILY
COMMUNITY
Start: 2018-06-18

## 2024-07-15 ENCOUNTER — OFFICE VISIT (OUTPATIENT)
Age: 59
End: 2024-07-15

## 2024-07-15 VITALS
WEIGHT: 168.8 LBS | HEIGHT: 69 IN | HEART RATE: 84 BPM | BODY MASS INDEX: 25 KG/M2 | DIASTOLIC BLOOD PRESSURE: 72 MMHG | OXYGEN SATURATION: 97 % | SYSTOLIC BLOOD PRESSURE: 116 MMHG | RESPIRATION RATE: 16 BRPM | TEMPERATURE: 97.8 F

## 2024-07-15 DIAGNOSIS — E04.1 NONTOXIC UNINODULAR GOITER: ICD-10-CM

## 2024-07-15 DIAGNOSIS — D50.8 OTHER IRON DEFICIENCY ANEMIA: ICD-10-CM

## 2024-07-15 DIAGNOSIS — E72.12 MTHFR (METHYLENE THF REDUCTASE) DEFICIENCY AND HOMOCYSTINURIA (HCC): ICD-10-CM

## 2024-07-15 DIAGNOSIS — E53.8 B12 DEFICIENCY: ICD-10-CM

## 2024-07-15 DIAGNOSIS — E72.11 MTHFR (METHYLENE THF REDUCTASE) DEFICIENCY AND HOMOCYSTINURIA (HCC): ICD-10-CM

## 2024-07-15 DIAGNOSIS — E55.9 VITAMIN D DEFICIENCY: Primary | ICD-10-CM

## 2024-07-15 DIAGNOSIS — E88.09 ALPHA GALACTOSIDASE DEFICIENCY: ICD-10-CM

## 2024-07-15 DIAGNOSIS — Z86.19 HISTORY OF LYME DISEASE: ICD-10-CM

## 2024-07-15 PROBLEM — D68.9 BLOOD COAGULATION DISORDER (HCC): Status: ACTIVE | Noted: 2024-07-05

## 2024-07-15 PROBLEM — A69.20 LYME DISEASE: Status: ACTIVE | Noted: 2024-07-05

## 2024-07-15 PROBLEM — H52.31 ANISOMETROPIA: Status: ACTIVE | Noted: 2024-07-15

## 2024-07-15 PROBLEM — H52.4 BILATERAL PRESBYOPIA: Status: ACTIVE | Noted: 2024-07-15

## 2024-07-15 PROBLEM — A77.0 ROCKY MOUNTAIN SPOTTED FEVER: Status: ACTIVE | Noted: 2024-07-05

## 2024-07-15 PROBLEM — G45.3 AMAUROSIS FUGAX: Status: ACTIVE | Noted: 2024-07-15

## 2024-07-15 PROBLEM — G43.109 OPHTHALMIC MIGRAINE: Status: ACTIVE | Noted: 2018-07-26

## 2024-07-15 PROBLEM — N95.1 MENOPAUSAL SYMPTOM: Status: ACTIVE | Noted: 2023-06-05

## 2024-07-15 PROBLEM — D50.9 IRON DEFICIENCY ANEMIA: Status: ACTIVE | Noted: 2024-07-15

## 2024-07-15 PROBLEM — H43.819 VITREOUS DEGENERATION: Status: ACTIVE | Noted: 2019-08-23

## 2024-07-15 PROBLEM — G45.1 CAROTID ARTERY SYNDROME HEMISPHERIC: Status: ACTIVE | Noted: 2024-07-15

## 2024-07-15 PROCEDURE — 99204 OFFICE O/P NEW MOD 45 MIN: CPT | Performed by: NURSE PRACTITIONER

## 2024-07-15 SDOH — HEALTH STABILITY: PHYSICAL HEALTH: ON AVERAGE, HOW MANY MINUTES DO YOU ENGAGE IN EXERCISE AT THIS LEVEL?: 30 MIN

## 2024-07-15 SDOH — HEALTH STABILITY: PHYSICAL HEALTH: ON AVERAGE, HOW MANY DAYS PER WEEK DO YOU ENGAGE IN MODERATE TO STRENUOUS EXERCISE (LIKE A BRISK WALK)?: 5 DAYS

## 2024-07-15 ASSESSMENT — PATIENT HEALTH QUESTIONNAIRE - PHQ9
2. FEELING DOWN, DEPRESSED OR HOPELESS: NOT AT ALL
SUM OF ALL RESPONSES TO PHQ QUESTIONS 1-9: 0
SUM OF ALL RESPONSES TO PHQ9 QUESTIONS 1 & 2: 0
SUM OF ALL RESPONSES TO PHQ QUESTIONS 1-9: 0
1. LITTLE INTEREST OR PLEASURE IN DOING THINGS: NOT AT ALL

## 2024-07-15 NOTE — PROGRESS NOTES
I have reviewed all needed documentation in preparation for visit. Verified patient by name and date of birth      Pt refused SDOH questions    Chief Complaint   Patient presents with    New Patient       \"Have you been to the ER, urgent care clinic since your last visit?  Hospitalized since your last visit?\"    NO    “Have you seen or consulted any other health care providers outside of [facility] since your last visit?”    NO    Have you had a mammogram?”   YES - Where: March 2021  Yesenia Rivas  At [facility]  Date of last Mammogram: 3/23/2021      “Have you had a pap smear?”    YES - Where: See above Nurse/CMA to request most recent records if not in the chart    Date of last Cervical Cancer screen (HPV or PAP): 5/24/2016         “Have you had a colorectal cancer screening such as a colonoscopy/FIT/Cologuard?    YES - Type: Colonoscopy - Where: 5 years ago     No colonoscopy on file  No cologuard on file  No FIT/FOBT on file   No flexible sigmoidoscopy on file         Click Here for Release of Records Request    Vitals:    07/15/24 1549   BP: 116/72   Site: Left Upper Arm   Position: Sitting   Cuff Size: Medium Adult   Pulse: 84   Resp: 16   Temp: 97.8 °F (36.6 °C)   TempSrc: Temporal   SpO2: 97%   Weight: 76.6 kg (168 lb 12.8 oz)   Height: 1.74 m (5' 8.5\")       Health Maintenance Due   Topic Date Due    Hepatitis B vaccine (1 of 3 - 3-dose series) Never done    COVID-19 Vaccine (1) Never done    Depression Screen  Never done    HIV screen  Never done    Hepatitis C screen  Never done    DTaP/Tdap/Td vaccine (1 - Tdap) Never done    Lipids  Never done    Colorectal Cancer Screen  Never done    Shingles vaccine (1 of 2) Never done    Cervical cancer screen  05/24/2021    Breast cancer screen  03/23/2023       Crystal Jessica LPN  
  Cardiovascular:      Rate and Rhythm: Normal rate and regular rhythm.      Pulses: Normal pulses.      Heart sounds: Normal heart sounds.   Pulmonary:      Effort: Pulmonary effort is normal. No respiratory distress.      Breath sounds: Normal breath sounds.   Abdominal:      General: Bowel sounds are normal.      Palpations: Abdomen is soft.      Tenderness: There is no abdominal tenderness.   Musculoskeletal:         General: Normal range of motion.      Cervical back: Normal range of motion and neck supple.   Lymphadenopathy:      Cervical: No cervical adenopathy.   Skin:     General: Skin is warm.      Capillary Refill: Capillary refill takes less than 2 seconds.   Neurological:      General: No focal deficit present.      Mental Status: She is alert. Mental status is at baseline.   Psychiatric:         Mood and Affect: Mood normal.     ASSESSMENT/PLAN  Gera was seen today for new patient.    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

## 2024-07-16 DIAGNOSIS — E04.1 NONTOXIC UNINODULAR GOITER: ICD-10-CM

## 2024-07-16 DIAGNOSIS — Z86.19 HISTORY OF LYME DISEASE: ICD-10-CM

## 2024-07-16 DIAGNOSIS — D50.8 OTHER IRON DEFICIENCY ANEMIA: ICD-10-CM

## 2024-07-16 DIAGNOSIS — E55.9 VITAMIN D DEFICIENCY: ICD-10-CM

## 2024-07-16 DIAGNOSIS — E88.09 ALPHA GALACTOSIDASE DEFICIENCY: ICD-10-CM

## 2024-07-17 LAB
25(OH)D3 SERPL-MCNC: 36.9 NG/ML (ref 30–100)
ALBUMIN SERPL-MCNC: 4 G/DL (ref 3.5–5)
ALBUMIN/GLOB SERPL: 1.1 (ref 1.1–2.2)
ALP SERPL-CCNC: 87 U/L (ref 45–117)
ALT SERPL-CCNC: 31 U/L (ref 12–78)
ANION GAP SERPL CALC-SCNC: 5 MMOL/L (ref 5–15)
AST SERPL-CCNC: 21 U/L (ref 15–37)
BASOPHILS # BLD: 0 K/UL (ref 0–0.1)
BASOPHILS NFR BLD: 1 % (ref 0–1)
BILIRUB SERPL-MCNC: 0.3 MG/DL (ref 0.2–1)
BUN SERPL-MCNC: 17 MG/DL (ref 6–20)
BUN/CREAT SERPL: 20 (ref 12–20)
CALCIUM SERPL-MCNC: 9.1 MG/DL (ref 8.5–10.1)
CHLORIDE SERPL-SCNC: 108 MMOL/L (ref 97–108)
CHOLEST SERPL-MCNC: 185 MG/DL
CO2 SERPL-SCNC: 26 MMOL/L (ref 21–32)
CREAT SERPL-MCNC: 0.83 MG/DL (ref 0.55–1.02)
DIFFERENTIAL METHOD BLD: NORMAL
EOSINOPHIL # BLD: 0.1 K/UL (ref 0–0.4)
EOSINOPHIL NFR BLD: 2 % (ref 0–7)
ERYTHROCYTE [DISTWIDTH] IN BLOOD BY AUTOMATED COUNT: 12.9 % (ref 11.5–14.5)
EST. AVERAGE GLUCOSE BLD GHB EST-MCNC: 103 MG/DL
GLOBULIN SER CALC-MCNC: 3.7 G/DL (ref 2–4)
GLUCOSE SERPL-MCNC: 86 MG/DL (ref 65–100)
HBA1C MFR BLD: 5.2 % (ref 4–5.6)
HCT VFR BLD AUTO: 38.9 % (ref 35–47)
HDLC SERPL-MCNC: 63 MG/DL
HDLC SERPL: 2.9 (ref 0–5)
HGB BLD-MCNC: 12.6 G/DL (ref 11.5–16)
IMM GRANULOCYTES # BLD AUTO: 0 K/UL (ref 0–0.04)
IMM GRANULOCYTES NFR BLD AUTO: 0 % (ref 0–0.5)
LDLC SERPL CALC-MCNC: 83 MG/DL (ref 0–100)
LYMPHOCYTES # BLD: 1.7 K/UL (ref 0.8–3.5)
LYMPHOCYTES NFR BLD: 39 % (ref 12–49)
MCH RBC QN AUTO: 32.1 PG (ref 26–34)
MCHC RBC AUTO-ENTMCNC: 32.4 G/DL (ref 30–36.5)
MCV RBC AUTO: 99 FL (ref 80–99)
MONOCYTES # BLD: 0.5 K/UL (ref 0–1)
MONOCYTES NFR BLD: 12 % (ref 5–13)
NEUTS SEG # BLD: 2 K/UL (ref 1.8–8)
NEUTS SEG NFR BLD: 46 % (ref 32–75)
NRBC # BLD: 0 K/UL (ref 0–0.01)
NRBC BLD-RTO: 0 PER 100 WBC
PLATELET # BLD AUTO: 328 K/UL (ref 150–400)
PMV BLD AUTO: 9.7 FL (ref 8.9–12.9)
POTASSIUM SERPL-SCNC: 4.7 MMOL/L (ref 3.5–5.1)
PROT SERPL-MCNC: 7.7 G/DL (ref 6.4–8.2)
RBC # BLD AUTO: 3.93 M/UL (ref 3.8–5.2)
SODIUM SERPL-SCNC: 139 MMOL/L (ref 136–145)
TRIGL SERPL-MCNC: 195 MG/DL
TSH SERPL DL<=0.05 MIU/L-ACNC: 1.6 UIU/ML (ref 0.36–3.74)
VLDLC SERPL CALC-MCNC: 39 MG/DL
WBC # BLD AUTO: 4.4 K/UL (ref 3.6–11)